# Patient Record
Sex: FEMALE
[De-identification: names, ages, dates, MRNs, and addresses within clinical notes are randomized per-mention and may not be internally consistent; named-entity substitution may affect disease eponyms.]

---

## 2023-05-18 ENCOUNTER — APPOINTMENT (OUTPATIENT)
Dept: INTERNAL MEDICINE | Facility: CLINIC | Age: 68
End: 2023-05-18
Payer: MEDICARE

## 2023-05-18 ENCOUNTER — INPATIENT (INPATIENT)
Facility: HOSPITAL | Age: 68
LOS: 6 days | Discharge: HOME | DRG: 304 | End: 2023-05-25
Attending: HOSPITALIST | Admitting: EMERGENCY MEDICINE
Payer: MEDICARE

## 2023-05-18 VITALS
TEMPERATURE: 98 F | OXYGEN SATURATION: 99 % | RESPIRATION RATE: 20 BRPM | DIASTOLIC BLOOD PRESSURE: 113 MMHG | WEIGHT: 111.99 LBS | HEART RATE: 93 BPM | SYSTOLIC BLOOD PRESSURE: 220 MMHG

## 2023-05-18 VITALS
WEIGHT: 108 LBS | OXYGEN SATURATION: 100 % | RESPIRATION RATE: 16 BRPM | TEMPERATURE: 97.8 F | SYSTOLIC BLOOD PRESSURE: 198 MMHG | HEIGHT: 63 IN | BODY MASS INDEX: 19.14 KG/M2 | HEART RATE: 95 BPM | DIASTOLIC BLOOD PRESSURE: 115 MMHG

## 2023-05-18 DIAGNOSIS — G47.00 INSOMNIA, UNSPECIFIED: ICD-10-CM

## 2023-05-18 DIAGNOSIS — Z83.3 FAMILY HISTORY OF DIABETES MELLITUS: ICD-10-CM

## 2023-05-18 DIAGNOSIS — Z00.00 ENCOUNTER FOR GENERAL ADULT MEDICAL EXAMINATION W/OUT ABNORMAL FINDINGS: ICD-10-CM

## 2023-05-18 DIAGNOSIS — I63.9 CEREBRAL INFARCTION, UNSPECIFIED: ICD-10-CM

## 2023-05-18 DIAGNOSIS — I67.1 CEREBRAL ANEURYSM, NONRUPTURED: ICD-10-CM

## 2023-05-18 DIAGNOSIS — I10 ESSENTIAL (PRIMARY) HYPERTENSION: ICD-10-CM

## 2023-05-18 DIAGNOSIS — Z83.438 FAMILY HISTORY OF OTHER DISORDER OF LIPOPROTEIN METABOLISM AND OTHER LIPIDEMIA: ICD-10-CM

## 2023-05-18 DIAGNOSIS — I71.40 ABDOMINAL AORTIC ANEURYSM, WITHOUT RUPTURE, UNSPECIFIED: ICD-10-CM

## 2023-05-18 DIAGNOSIS — E78.5 HYPERLIPIDEMIA, UNSPECIFIED: ICD-10-CM

## 2023-05-18 DIAGNOSIS — I21.9 ACUTE MYOCARDIAL INFARCTION, UNSPECIFIED: ICD-10-CM

## 2023-05-18 DIAGNOSIS — I16.1 HYPERTENSIVE EMERGENCY: ICD-10-CM

## 2023-05-18 DIAGNOSIS — Z95.5 PRESENCE OF CORONARY ANGIOPLASTY IMPLANT AND GRAFT: ICD-10-CM

## 2023-05-18 DIAGNOSIS — Z82.49 FAMILY HISTORY OF ISCHEMIC HEART DISEASE AND OTHER DISEASES OF THE CIRCULATORY SYSTEM: ICD-10-CM

## 2023-05-18 LAB
ALBUMIN SERPL ELPH-MCNC: 2.9 G/DL — LOW (ref 3.3–5)
ALP SERPL-CCNC: 111 U/L — SIGNIFICANT CHANGE UP (ref 40–120)
ALT FLD-CCNC: 25 U/L — SIGNIFICANT CHANGE UP (ref 12–78)
ANION GAP SERPL CALC-SCNC: 8 MMOL/L — SIGNIFICANT CHANGE UP (ref 5–17)
APPEARANCE UR: CLEAR — SIGNIFICANT CHANGE UP
AST SERPL-CCNC: 19 U/L — SIGNIFICANT CHANGE UP (ref 15–37)
BASOPHILS # BLD AUTO: 0.03 K/UL — SIGNIFICANT CHANGE UP (ref 0–0.2)
BASOPHILS NFR BLD AUTO: 0.4 % — SIGNIFICANT CHANGE UP (ref 0–2)
BILIRUB SERPL-MCNC: 0.3 MG/DL — SIGNIFICANT CHANGE UP (ref 0.2–1.2)
BILIRUB UR-MCNC: NEGATIVE — SIGNIFICANT CHANGE UP
BUN SERPL-MCNC: 27 MG/DL — HIGH (ref 7–23)
CALCIUM SERPL-MCNC: 8.3 MG/DL — LOW (ref 8.5–10.1)
CHLORIDE SERPL-SCNC: 111 MMOL/L — HIGH (ref 96–108)
CO2 SERPL-SCNC: 24 MMOL/L — SIGNIFICANT CHANGE UP (ref 22–31)
COLOR SPEC: YELLOW — SIGNIFICANT CHANGE UP
CREAT SERPL-MCNC: 1.3 MG/DL — SIGNIFICANT CHANGE UP (ref 0.5–1.3)
DIFF PNL FLD: NEGATIVE — SIGNIFICANT CHANGE UP
EGFR: 45 ML/MIN/1.73M2 — LOW
EOSINOPHIL # BLD AUTO: 0.24 K/UL — SIGNIFICANT CHANGE UP (ref 0–0.5)
EOSINOPHIL NFR BLD AUTO: 3 % — SIGNIFICANT CHANGE UP (ref 0–6)
GLUCOSE SERPL-MCNC: 83 MG/DL — SIGNIFICANT CHANGE UP (ref 70–99)
GLUCOSE UR QL: NEGATIVE — SIGNIFICANT CHANGE UP
HCT VFR BLD CALC: 31.5 % — LOW (ref 34.5–45)
HGB BLD-MCNC: 9.7 G/DL — LOW (ref 11.5–15.5)
IMM GRANULOCYTES NFR BLD AUTO: 0.5 % — SIGNIFICANT CHANGE UP (ref 0–0.9)
KETONES UR-MCNC: NEGATIVE — SIGNIFICANT CHANGE UP
LEUKOCYTE ESTERASE UR-ACNC: NEGATIVE — SIGNIFICANT CHANGE UP
LYMPHOCYTES # BLD AUTO: 2.14 K/UL — SIGNIFICANT CHANGE UP (ref 1–3.3)
LYMPHOCYTES # BLD AUTO: 27 % — SIGNIFICANT CHANGE UP (ref 13–44)
MAGNESIUM SERPL-MCNC: 1.8 MG/DL — SIGNIFICANT CHANGE UP (ref 1.6–2.6)
MCHC RBC-ENTMCNC: 28.5 PG — SIGNIFICANT CHANGE UP (ref 27–34)
MCHC RBC-ENTMCNC: 30.8 GM/DL — LOW (ref 32–36)
MCV RBC AUTO: 92.6 FL — SIGNIFICANT CHANGE UP (ref 80–100)
MONOCYTES # BLD AUTO: 0.57 K/UL — SIGNIFICANT CHANGE UP (ref 0–0.9)
MONOCYTES NFR BLD AUTO: 7.2 % — SIGNIFICANT CHANGE UP (ref 2–14)
NEUTROPHILS # BLD AUTO: 4.9 K/UL — SIGNIFICANT CHANGE UP (ref 1.8–7.4)
NEUTROPHILS NFR BLD AUTO: 61.9 % — SIGNIFICANT CHANGE UP (ref 43–77)
NITRITE UR-MCNC: NEGATIVE — SIGNIFICANT CHANGE UP
NT-PROBNP SERPL-SCNC: HIGH PG/ML (ref 0–125)
PH UR: 6 — SIGNIFICANT CHANGE UP (ref 5–8)
PLATELET # BLD AUTO: 539 K/UL — HIGH (ref 150–400)
POTASSIUM SERPL-MCNC: 5.6 MMOL/L — HIGH (ref 3.5–5.3)
POTASSIUM SERPL-SCNC: 5.6 MMOL/L — HIGH (ref 3.5–5.3)
PROT SERPL-MCNC: 8.2 GM/DL — SIGNIFICANT CHANGE UP (ref 6–8.3)
PROT UR-MCNC: 30 MG/DL
RBC # BLD: 3.4 M/UL — LOW (ref 3.8–5.2)
RBC # FLD: 16.8 % — HIGH (ref 10.3–14.5)
SODIUM SERPL-SCNC: 143 MMOL/L — SIGNIFICANT CHANGE UP (ref 135–145)
SP GR SPEC: 1.01 — SIGNIFICANT CHANGE UP (ref 1.01–1.02)
TROPONIN I, HIGH SENSITIVITY RESULT: 61.16 NG/L — HIGH
TROPONIN I, HIGH SENSITIVITY RESULT: 61.25 NG/L — HIGH
UROBILINOGEN FLD QL: NEGATIVE — SIGNIFICANT CHANGE UP
WBC # BLD: 7.92 K/UL — SIGNIFICANT CHANGE UP (ref 3.8–10.5)
WBC # FLD AUTO: 7.92 K/UL — SIGNIFICANT CHANGE UP (ref 3.8–10.5)

## 2023-05-18 PROCEDURE — 94760 N-INVAS EAR/PLS OXIMETRY 1: CPT

## 2023-05-18 PROCEDURE — 81001 URINALYSIS AUTO W/SCOPE: CPT

## 2023-05-18 PROCEDURE — 97530 THERAPEUTIC ACTIVITIES: CPT | Mod: GP

## 2023-05-18 PROCEDURE — 84484 ASSAY OF TROPONIN QUANT: CPT

## 2023-05-18 PROCEDURE — 83880 ASSAY OF NATRIURETIC PEPTIDE: CPT

## 2023-05-18 PROCEDURE — 83605 ASSAY OF LACTIC ACID: CPT

## 2023-05-18 PROCEDURE — 82570 ASSAY OF URINE CREATININE: CPT

## 2023-05-18 PROCEDURE — 80053 COMPREHEN METABOLIC PANEL: CPT

## 2023-05-18 PROCEDURE — 84145 PROCALCITONIN (PCT): CPT

## 2023-05-18 PROCEDURE — 97162 PT EVAL MOD COMPLEX 30 MIN: CPT | Mod: GP

## 2023-05-18 PROCEDURE — 85025 COMPLETE CBC W/AUTO DIFF WBC: CPT

## 2023-05-18 PROCEDURE — 86803 HEPATITIS C AB TEST: CPT

## 2023-05-18 PROCEDURE — 97116 GAIT TRAINING THERAPY: CPT | Mod: GP

## 2023-05-18 PROCEDURE — 80048 BASIC METABOLIC PNL TOTAL CA: CPT

## 2023-05-18 PROCEDURE — 71045 X-RAY EXAM CHEST 1 VIEW: CPT | Mod: 26

## 2023-05-18 PROCEDURE — 85027 COMPLETE CBC AUTOMATED: CPT

## 2023-05-18 PROCEDURE — 84156 ASSAY OF PROTEIN URINE: CPT

## 2023-05-18 PROCEDURE — 99285 EMERGENCY DEPT VISIT HI MDM: CPT

## 2023-05-18 PROCEDURE — 82306 VITAMIN D 25 HYDROXY: CPT

## 2023-05-18 PROCEDURE — 82607 VITAMIN B-12: CPT

## 2023-05-18 PROCEDURE — 83540 ASSAY OF IRON: CPT

## 2023-05-18 PROCEDURE — 93010 ELECTROCARDIOGRAM REPORT: CPT

## 2023-05-18 PROCEDURE — 71046 X-RAY EXAM CHEST 2 VIEWS: CPT

## 2023-05-18 PROCEDURE — 93005 ELECTROCARDIOGRAM TRACING: CPT

## 2023-05-18 PROCEDURE — 84300 ASSAY OF URINE SODIUM: CPT

## 2023-05-18 PROCEDURE — 83550 IRON BINDING TEST: CPT

## 2023-05-18 PROCEDURE — 84466 ASSAY OF TRANSFERRIN: CPT

## 2023-05-18 PROCEDURE — 82728 ASSAY OF FERRITIN: CPT

## 2023-05-18 PROCEDURE — 84100 ASSAY OF PHOSPHORUS: CPT

## 2023-05-18 PROCEDURE — 93306 TTE W/DOPPLER COMPLETE: CPT

## 2023-05-18 PROCEDURE — 99387 INIT PM E/M NEW PAT 65+ YRS: CPT

## 2023-05-18 PROCEDURE — 36415 COLL VENOUS BLD VENIPUNCTURE: CPT

## 2023-05-18 PROCEDURE — 83735 ASSAY OF MAGNESIUM: CPT

## 2023-05-18 PROCEDURE — 70450 CT HEAD/BRAIN W/O DYE: CPT | Mod: 26,MA

## 2023-05-18 RX ORDER — LABETALOL HCL 100 MG
10 TABLET ORAL ONCE
Refills: 0 | Status: COMPLETED | OUTPATIENT
Start: 2023-05-18 | End: 2023-05-18

## 2023-05-18 RX ORDER — CARVEDILOL PHOSPHATE 80 MG/1
25 CAPSULE, EXTENDED RELEASE ORAL ONCE
Refills: 0 | Status: COMPLETED | OUTPATIENT
Start: 2023-05-18 | End: 2023-05-18

## 2023-05-18 RX ORDER — ASPIRIN/CALCIUM CARB/MAGNESIUM 324 MG
324 TABLET ORAL ONCE
Refills: 0 | Status: COMPLETED | OUTPATIENT
Start: 2023-05-18 | End: 2023-05-18

## 2023-05-18 RX ORDER — HYDRALAZINE HCL 50 MG
25 TABLET ORAL ONCE
Refills: 0 | Status: COMPLETED | OUTPATIENT
Start: 2023-05-18 | End: 2023-05-18

## 2023-05-18 RX ORDER — OXYCODONE HYDROCHLORIDE 5 MG/1
5 TABLET ORAL ONCE
Refills: 0 | Status: DISCONTINUED | OUTPATIENT
Start: 2023-05-18 | End: 2023-05-18

## 2023-05-18 RX ORDER — ALPRAZOLAM 0.25 MG
0.5 TABLET ORAL ONCE
Refills: 0 | Status: DISCONTINUED | OUTPATIENT
Start: 2023-05-18 | End: 2023-05-18

## 2023-05-18 RX ADMIN — Medication 324 MILLIGRAM(S): at 17:41

## 2023-05-18 RX ADMIN — Medication 0.5 MILLIGRAM(S): at 21:15

## 2023-05-18 RX ADMIN — OXYCODONE HYDROCHLORIDE 5 MILLIGRAM(S): 5 TABLET ORAL at 15:19

## 2023-05-18 RX ADMIN — Medication 5 MILLIGRAM(S): at 18:22

## 2023-05-18 RX ADMIN — Medication 25 MILLIGRAM(S): at 20:31

## 2023-05-18 RX ADMIN — CARVEDILOL PHOSPHATE 25 MILLIGRAM(S): 80 CAPSULE, EXTENDED RELEASE ORAL at 20:31

## 2023-05-18 RX ADMIN — Medication 10 MILLIGRAM(S): at 15:19

## 2023-05-18 NOTE — ED PROVIDER NOTE - ATTENDING CONTRIBUTION TO CARE
Dr. Ghosh: I performed a face to face bedside interview with patient regarding history of present illness, review of symptoms and past medical history. I completed an independent physical exam.  I have discussed patient's plan of care with resident.   I agree with note as stated above, having amended the EMR as needed to reflect my findings.   This includes HISTORY OF PRESENT ILLNESS, HIV, PAST MEDICAL/SURGICAL/FAMILY/SOCIAL HISTORY, ALLERGIES AND HOME MEDICATIONS, REVIEW OF SYSTEMS, PHYSICAL EXAM, and any PROGRESS NOTES during the time I functioned as the attending physician for this patient.  PIERO Ghosh DO

## 2023-05-18 NOTE — ED ADULT NURSE NOTE - NSFALLUNIVINTERV_ED_ALL_ED
Bed/Stretcher in lowest position, wheels locked, appropriate side rails in place/Call bell, personal items and telephone in reach/Instruct patient to call for assistance before getting out of bed/chair/stretcher/Non-slip footwear applied when patient is off stretcher/[place] to call system/Physically safe environment - no spills, clutter or unnecessary equipment/Purposeful proactive rounding/Room/bathroom lighting operational, light cord in reach

## 2023-05-18 NOTE — ED PROVIDER NOTE - CLINICAL SUMMARY MEDICAL DECISION MAKING FREE TEXT BOX
pt with HTN emergency will get CAT scan of head because of HA, labs troponin, social work consult and admit pt until her social, emotional problems can be worked out

## 2023-05-18 NOTE — ED ADULT NURSE NOTE - OBJECTIVE STATEMENT
presents to ed from pmd office for elevated blood pressure. patient is supposed to be on htn medications but has no been able to refill her medications due to the patients insurance changing and the dr no longer accepting her insurance. patient bp upon arrival elevated, patient given two doses of iv labetalol with positive relief

## 2023-05-18 NOTE — ED PROVIDER NOTE - PROGRESS NOTE DETAILS
Patient remains hypertensive.  Lab work reviewed and notable for troponin leak and an elevated BNP.  Concerns for possible hypertensive emergency at this time.  EKG reviewed and as noted below:    Heart rate 97 bpm's within normal limits,  ms within normal limits, QRS 66 ms within normal limits, QTc 454 ms within normal limits.  There are no acute ST segment elevations or STEMI's.  No ST segment depressions or pathologic T wave inversions.    Given patient's troponin leak aspirin was ordered, pending repeat troponin at this time.  Will control blood pressure in the setting of a troponin leak, likely type II MI secondary to hypertension.  We will control hypertension with labetalol.  Will admit patient for further evaluation and management.

## 2023-05-18 NOTE — ED PROVIDER NOTE - OBJECTIVE STATEMENT
67 yo female wPMHx of HTN, depression, chronic pain, CAD, stents, abd aneurysm with surgery presents to the ED from MD Dr. Aguayo as her new primary care doctor  c/o HTN, HA, depression. Pt has been living in a shelter for a month after coming back from [place]. Her insurance did not cover her medication and primary care doctor for a month. Denies chest pain, SI/HI

## 2023-05-18 NOTE — ED PROVIDER NOTE - CARE PLAN
1 Principal Discharge DX:	Hypertensive emergency  Secondary Diagnosis:	NSTEMI (non-ST elevation myocardial infarction)

## 2023-05-18 NOTE — ED PROVIDER NOTE - NS ED ATTENDING STATEMENT MOD
I have seen and examined this patient and fully participated in the care of this patient as the teaching attending.  The service was shared with the LEE.  I reviewed and verified the documentation and independently performed the documented:

## 2023-05-18 NOTE — ED ADULT TRIAGE NOTE - CHIEF COMPLAINT QUOTE
pt presents to ED from MD office. c/o hypertension. c/o HA. denies blurry vision, chest pain. pt was homeless and not able to get HTN medications. now living in shelter.

## 2023-05-19 VITALS — SYSTOLIC BLOOD PRESSURE: 195 MMHG | DIASTOLIC BLOOD PRESSURE: 110 MMHG

## 2023-05-19 DIAGNOSIS — I25.10 ATHEROSCLEROTIC HEART DISEASE OF NATIVE CORONARY ARTERY WITHOUT ANGINA PECTORIS: ICD-10-CM

## 2023-05-19 DIAGNOSIS — I10 ESSENTIAL (PRIMARY) HYPERTENSION: ICD-10-CM

## 2023-05-19 PROBLEM — I71.40 ABDOMINAL AORTIC ANEURYSM (AAA): Status: ACTIVE | Noted: 2023-05-19

## 2023-05-19 PROBLEM — E78.5 HLD (HYPERLIPIDEMIA): Status: ACTIVE | Noted: 2023-05-19

## 2023-05-19 PROBLEM — Z00.00 ENCOUNTER FOR PREVENTIVE HEALTH EXAMINATION: Status: ACTIVE | Noted: 2023-05-17

## 2023-05-19 PROBLEM — I63.9 STROKE: Status: ACTIVE | Noted: 2023-05-19

## 2023-05-19 PROBLEM — I67.1 BRAIN ANEURYSM: Status: ACTIVE | Noted: 2023-05-19

## 2023-05-19 PROBLEM — G47.00 INSOMNIA: Status: ACTIVE | Noted: 2023-05-19

## 2023-05-19 PROBLEM — I21.9 MYOCARDIAL INFARCTION: Status: ACTIVE | Noted: 2023-05-19

## 2023-05-19 PROBLEM — Z95.5 HISTORY OF HEART ARTERY STENT: Status: ACTIVE | Noted: 2023-05-19

## 2023-05-19 LAB
ANION GAP SERPL CALC-SCNC: 6 MMOL/L — SIGNIFICANT CHANGE UP (ref 5–17)
BACTERIA # UR AUTO: ABNORMAL
BUN SERPL-MCNC: 28 MG/DL — HIGH (ref 7–23)
CALCIUM SERPL-MCNC: 7.6 MG/DL — LOW (ref 8.5–10.1)
CHLORIDE SERPL-SCNC: 113 MMOL/L — HIGH (ref 96–108)
CO2 SERPL-SCNC: 24 MMOL/L — SIGNIFICANT CHANGE UP (ref 22–31)
CREAT SERPL-MCNC: 1.29 MG/DL — SIGNIFICANT CHANGE UP (ref 0.5–1.3)
EGFR: 45 ML/MIN/1.73M2 — LOW
EPI CELLS # UR: SIGNIFICANT CHANGE UP
GLUCOSE SERPL-MCNC: 93 MG/DL — SIGNIFICANT CHANGE UP (ref 70–99)
HCT VFR BLD CALC: 26.2 % — LOW (ref 34.5–45)
HCV AB S/CO SERPL IA: 0.16 S/CO — SIGNIFICANT CHANGE UP (ref 0–0.99)
HCV AB SERPL-IMP: SIGNIFICANT CHANGE UP
HGB BLD-MCNC: 8 G/DL — LOW (ref 11.5–15.5)
IRON SATN MFR SERPL: 25 UG/DL — LOW (ref 30–160)
IRON SATN MFR SERPL: 7 % — LOW (ref 14–50)
MCHC RBC-ENTMCNC: 28.1 PG — SIGNIFICANT CHANGE UP (ref 27–34)
MCHC RBC-ENTMCNC: 30.5 GM/DL — LOW (ref 32–36)
MCV RBC AUTO: 91.9 FL — SIGNIFICANT CHANGE UP (ref 80–100)
PLATELET # BLD AUTO: 446 K/UL — HIGH (ref 150–400)
POTASSIUM SERPL-MCNC: 5.5 MMOL/L — HIGH (ref 3.5–5.3)
POTASSIUM SERPL-SCNC: 5.5 MMOL/L — HIGH (ref 3.5–5.3)
RBC # BLD: 2.85 M/UL — LOW (ref 3.8–5.2)
RBC # FLD: 16.8 % — HIGH (ref 10.3–14.5)
RBC CASTS # UR COMP ASSIST: SIGNIFICANT CHANGE UP /HPF (ref 0–4)
SODIUM SERPL-SCNC: 143 MMOL/L — SIGNIFICANT CHANGE UP (ref 135–145)
TIBC SERPL-MCNC: 377 UG/DL — SIGNIFICANT CHANGE UP (ref 220–430)
TROPONIN I, HIGH SENSITIVITY RESULT: 44.35 NG/L — SIGNIFICANT CHANGE UP
UIBC SERPL-MCNC: 353 UG/DL — SIGNIFICANT CHANGE UP (ref 110–370)
WBC # BLD: 7.59 K/UL — SIGNIFICANT CHANGE UP (ref 3.8–10.5)
WBC # FLD AUTO: 7.59 K/UL — SIGNIFICANT CHANGE UP (ref 3.8–10.5)
WBC UR QL: SIGNIFICANT CHANGE UP /HPF (ref 0–5)

## 2023-05-19 PROCEDURE — 99222 1ST HOSP IP/OBS MODERATE 55: CPT

## 2023-05-19 PROCEDURE — 93306 TTE W/DOPPLER COMPLETE: CPT | Mod: 26

## 2023-05-19 PROCEDURE — 12345: CPT | Mod: NC

## 2023-05-19 RX ORDER — ACETAMINOPHEN 500 MG
650 TABLET ORAL EVERY 6 HOURS
Refills: 0 | Status: DISCONTINUED | OUTPATIENT
Start: 2023-05-19 | End: 2023-05-25

## 2023-05-19 RX ORDER — HYDRALAZINE HCL 50 MG
25 TABLET ORAL
Refills: 0 | Status: DISCONTINUED | OUTPATIENT
Start: 2023-05-19 | End: 2023-05-25

## 2023-05-19 RX ORDER — ALPRAZOLAM 0.25 MG
0.5 TABLET ORAL
Refills: 0 | Status: DISCONTINUED | OUTPATIENT
Start: 2023-05-19 | End: 2023-05-25

## 2023-05-19 RX ORDER — GABAPENTIN 400 MG/1
300 CAPSULE ORAL
Refills: 0 | Status: DISCONTINUED | OUTPATIENT
Start: 2023-05-19 | End: 2023-05-25

## 2023-05-19 RX ORDER — ONDANSETRON 8 MG/1
4 TABLET, FILM COATED ORAL EVERY 8 HOURS
Refills: 0 | Status: DISCONTINUED | OUTPATIENT
Start: 2023-05-19 | End: 2023-05-25

## 2023-05-19 RX ORDER — SODIUM ZIRCONIUM CYCLOSILICATE 10 G/10G
10 POWDER, FOR SUSPENSION ORAL ONCE
Refills: 0 | Status: COMPLETED | OUTPATIENT
Start: 2023-05-19 | End: 2023-05-19

## 2023-05-19 RX ORDER — ATORVASTATIN CALCIUM 80 MG/1
10 TABLET, FILM COATED ORAL AT BEDTIME
Refills: 0 | Status: DISCONTINUED | OUTPATIENT
Start: 2023-05-19 | End: 2023-05-25

## 2023-05-19 RX ORDER — LANOLIN ALCOHOL/MO/W.PET/CERES
3 CREAM (GRAM) TOPICAL AT BEDTIME
Refills: 0 | Status: DISCONTINUED | OUTPATIENT
Start: 2023-05-19 | End: 2023-05-25

## 2023-05-19 RX ORDER — HYDRALAZINE HCL 50 MG
25 TABLET ORAL ONCE
Refills: 0 | Status: COMPLETED | OUTPATIENT
Start: 2023-05-19 | End: 2023-05-19

## 2023-05-19 RX ORDER — CARVEDILOL PHOSPHATE 80 MG/1
25 CAPSULE, EXTENDED RELEASE ORAL EVERY 12 HOURS
Refills: 0 | Status: DISCONTINUED | OUTPATIENT
Start: 2023-05-19 | End: 2023-05-25

## 2023-05-19 RX ORDER — OXYCODONE HYDROCHLORIDE 5 MG/1
5 TABLET ORAL ONCE
Refills: 0 | Status: DISCONTINUED | OUTPATIENT
Start: 2023-05-19 | End: 2023-05-19

## 2023-05-19 RX ORDER — ALPRAZOLAM 0.25 MG
0.5 TABLET ORAL ONCE
Refills: 0 | Status: DISCONTINUED | OUTPATIENT
Start: 2023-05-19 | End: 2023-05-19

## 2023-05-19 RX ORDER — PANTOPRAZOLE SODIUM 20 MG/1
40 TABLET, DELAYED RELEASE ORAL
Refills: 0 | Status: DISCONTINUED | OUTPATIENT
Start: 2023-05-19 | End: 2023-05-25

## 2023-05-19 RX ORDER — ASPIRIN/CALCIUM CARB/MAGNESIUM 324 MG
81 TABLET ORAL DAILY
Refills: 0 | Status: DISCONTINUED | OUTPATIENT
Start: 2023-05-19 | End: 2023-05-25

## 2023-05-19 RX ADMIN — Medication 0.5 MILLIGRAM(S): at 09:09

## 2023-05-19 RX ADMIN — SODIUM ZIRCONIUM CYCLOSILICATE 10 GRAM(S): 10 POWDER, FOR SUSPENSION ORAL at 11:42

## 2023-05-19 RX ADMIN — Medication 25 MILLIGRAM(S): at 23:18

## 2023-05-19 RX ADMIN — ATORVASTATIN CALCIUM 10 MILLIGRAM(S): 80 TABLET, FILM COATED ORAL at 21:29

## 2023-05-19 RX ADMIN — CARVEDILOL PHOSPHATE 25 MILLIGRAM(S): 80 CAPSULE, EXTENDED RELEASE ORAL at 21:29

## 2023-05-19 RX ADMIN — Medication 25 MILLIGRAM(S): at 02:41

## 2023-05-19 RX ADMIN — ONDANSETRON 4 MILLIGRAM(S): 8 TABLET, FILM COATED ORAL at 05:39

## 2023-05-19 RX ADMIN — CARVEDILOL PHOSPHATE 25 MILLIGRAM(S): 80 CAPSULE, EXTENDED RELEASE ORAL at 10:16

## 2023-05-19 RX ADMIN — Medication 25 MILLIGRAM(S): at 13:13

## 2023-05-19 RX ADMIN — Medication 25 MILLIGRAM(S): at 05:39

## 2023-05-19 RX ADMIN — Medication 25 MILLIGRAM(S): at 17:37

## 2023-05-19 RX ADMIN — GABAPENTIN 300 MILLIGRAM(S): 400 CAPSULE ORAL at 08:07

## 2023-05-19 RX ADMIN — Medication 81 MILLIGRAM(S): at 10:16

## 2023-05-19 RX ADMIN — PANTOPRAZOLE SODIUM 40 MILLIGRAM(S): 20 TABLET, DELAYED RELEASE ORAL at 05:39

## 2023-05-19 RX ADMIN — SODIUM ZIRCONIUM CYCLOSILICATE 10 GRAM(S): 10 POWDER, FOR SUSPENSION ORAL at 05:39

## 2023-05-19 RX ADMIN — Medication 0.5 MILLIGRAM(S): at 15:28

## 2023-05-19 RX ADMIN — OXYCODONE HYDROCHLORIDE 5 MILLIGRAM(S): 5 TABLET ORAL at 01:01

## 2023-05-19 NOTE — HEALTH RISK ASSESSMENT
[No] : In the past 12 months have you used drugs other than those required for medical reasons? No [3] : 2) Feeling down, depressed, or hopeless for nearly every day (3) [PHQ-2 Positive] : PHQ-2 Positive [Current] : Current [5-9] : 5-9 [Patient reported mammogram was normal] : Patient reported mammogram was normal [Patient reported PAP Smear was normal] : Patient reported PAP Smear was normal [Patient reported bone density results were abnormal] : Patient reported bone density results were abnormal [Patient reported colonoscopy was normal] : Patient reported colonoscopy was normal [HIV Test offered] : HIV Test offered [Hepatitis C test offered] : Hepatitis C test offered [Homeless] : homeless [Retired] : retired [Single] : single [de-identified] : she used to drink alochol. quit when she was 39.  [MammogramDate] : 01/2021 [PapSmearDate] : 01/2018 [BoneDensityDate] : 01/2018 [BoneDensityComments] : osteopenia [ColonoscopyDate] : 01/2022 [ColonoscopyComments] : states she had EGD and colonoscopy last year in [place].  [FreeTextEntry2] : she used to waittress, .  [de-identified] : smokes 2 ciggs daily.  quti for 2 years. and restarted a month ago. used to smoke seldomly prior since she was 16yo.

## 2023-05-19 NOTE — H&P ADULT - NSHPPHYSICALEXAM_GEN_ALL_CORE
T(C): 36.3 (05-19-23 @ 02:39), Max: 37 (05-18-23 @ 15:17)  HR: 82 (05-19-23 @ 03:51) (74 - 93)  BP: 190/81 (05-19-23 @ 03:51) (150/79 - 220/113)  RR: 16 (05-19-23 @ 02:39) (15 - 22)  SpO2: 98% (05-19-23 @ 02:39) (88% - 100%)    CONSTITUTIONAL: Well groomed, no apparent distress  EYES: PERRLA and symmetric, EOMI, No conjunctival or scleral injection, non-icteric  ENMT: Oral mucosa with moist membranes. Normal dentition; no pharyngeal injection or exudates             NECK: Supple, symmetric and without tracheal deviation   RESP: No respiratory distress, no use of accessory muscles; CTA b/l, no WRR  CV: RRR, +S1S2, no MRG; no JVD; no peripheral edema  GI: Soft, NT, ND, no rebound, no guarding; no palpable masses; no hepatosplenomegaly; no hernia palpated  LYMPH: No cervical LAD or tenderness; no axillary LAD or tenderness; no inguinal LAD or tenderness  MSK: Normal ROM without pain, normal muscle strength/tone  SKIN: No rashes or ulcers noted; no subcutaneous nodules or induration palpable  NEURO: CN II-XII intact; normal reflexes in upper and lower extremities, sensation intact in upper and lower extremities b/l to light touch   PSYCH: Appropriate insight/judgment; A+O x 3, mood and affect appropriate, recent/remote memory intact

## 2023-05-19 NOTE — PROGRESS NOTE ADULT - ASSESSMENT
69 yo woman with CAD, hx of MI, chronic pain, osteoarthritis, not on medication for past several weeks as she was unable to be seen by her PCP, is now undomiciled, sent to ED from new PCP office when she was noted there to have markedly elevated blood pressures. In the ED here, BP elevated. Troponin minimally elevated. BNP 29,000. EKG without acute ischemic changes. CXR clear. CT head w/ old lacunar infarct. Admitted to Medicine.     HTN emergency  BP now improved. Rather asymptomatic.   - Continue coreg, hydralazine    CAD  No angina or CHF sx.   - Continue coreg, aspirin, statin      Dispo: Pending shelter placement, needs 3-night stay  69 yo woman with CAD, hx of MI, chronic pain, osteoarthritis, not on medication for past several weeks as she was unable to be seen by her PCP, is now undomiciled, sent to ED from new PCP office when she was noted there to have markedly elevated blood pressures. In the ED here, BP elevated. Troponin minimally elevated. BNP 29,000. EKG without acute ischemic changes. CXR clear. CT head w/ old lacunar infarct. Admitted to Medicine.     HTN emergency  BP now improved. Rather asymptomatic.   - Continue coreg, hydralazine    CAD  No angina or CHF sx.   - Continue coreg, aspirin, statin    HyperK  K 5.5. Lokelma ordered  - Trend K      Dispo: Pending shelter placement, needs 3-night stay

## 2023-05-19 NOTE — ED ADULT NURSE REASSESSMENT NOTE - NS ED NURSE REASSESS COMMENT FT1
Pt resting comfortably in bed, Hypertensive, MD made aware, no STAT orders at this time, Medicated for pain, pt reports no pain at this time.

## 2023-05-19 NOTE — HISTORY OF PRESENT ILLNESS
[FreeTextEntry1] : cpe [de-identified] : 68y f w/ pMHx HTN , sciatica,. brain aneurysm,  abdominal aneurysm, heart attack 2019 in [place] s/p stents, mini stroke 05/2022 and 6/2022, current smoker, presenting to establish care/ cpe. \par  \par she has been living in a shelter for the last month. has been out of medication for the last month as she was having issues with her insurance.  states she is severely depressed. \par \par states that Dr. Sherman in [place] is her pain management doctor. states she has received epidurals for her back pain. she hasn’t seen recently though bc of insurance issues which she states are now settled. she plans to go back to them now that insurance is settled but states they want her to estbalihs care with PCP first. she recently came from [place]. states she had her GB removed in march and developed scar tissue. she also has chronic pain of her shoulder since shoulder surgery a year ago. also has sciatica. states she receives oxycodone and would like a refill to hold her over until she can see pain management again. ISTOP checked. Reference #:[id number]\par \par HTN: not currently taking any meds other than baby aspirin OTC for the laste month but prior she was on carvedilol 25mg BID, hydralazine and 25mg 4x daily.\par \par HLD: states she used to take a statin but she is not sure which. \par \par insomnia: melatonin 10mg. \par \par gabapentin states she is on 800mg 4x daily. \par \par states her old pcp used to give her xanax. she used to take it everyday BID since last year. she last took a dose a month ago. she would like refill. \par                  \par she has not met with vascular surgeon for her AA. does not have a cardiologist in [place]. \par \par she takes a MVT.

## 2023-05-19 NOTE — PATIENT PROFILE ADULT - SAFE PLACE TO LIVE - DETAILS
patient lives in a homeless shelter and will lose her bed if she is ever in the hospital for longer than 3 days.

## 2023-05-19 NOTE — ASSESSMENT
[FreeTextEntry1] : Physical Exam:\par Constitutional: distressed, tearful, anxious\par HEENT: Normocephalic, atraumatic\par Neck: supple\par Cardiac:  Regular rate and rhythm, No murmurs\par Pulmonary: No respiratory distress, Lungs clear to auscultation bilaterally, no wheezing, rales, or rhonchi\par Abdomen: Soft, non-tender, non-distended, no guarding, normal bowel sounds\par Vascular: No peripheral edema\par Neurology: Coordination grossly intact, no focal deficits\par Psychiatric: anxious, irritable\par \par \par \par A/P:\par HCM:\par - flu- UTD\par - TDAP- UTD\par - pneumonia vaccine- UTD\par - shingles vaccine - UTD\par - Colon CA screening- states she had done in [place] last year\par - Breast CA screening- due\par - cervical CA screening- due\par - DEXA screening- due\par \par HTN/AAA/ CVD/ CAD/ cardiac stents\par BP very elevated\par pt very high risk for MI, stroke, aneurysmal rupture\par - pt was urgently sent to the ER for complete evaluation and tx. will need urgent labs, ekg, cardiology evaluation\par - pt will need close monitoring while BP is slowly dropped to normal range \par - pt was rec to f/u upon hospital discharge\par - pt will need to continue to see cardiology as outpatient once medically stable\par \par brain aneurysm\par  - pt will need to establish care with neurosurgery once medicall stable\par \par depression\par pt denies SI or intent to hurt herself or others\par but she is severely depressed, very tearful on exam\par - pt will need psychiatry consulted in the hospital as well \par - would benefit from psychiatry and therapy continued as outpatient as well\par - will defer xanax dosing to them, pt aware that long term benzo will need to come from them\par \par chronic pain/ sciatica/ shoulder pain\par  ISTOP checked. Reference #:[id number]\par pt was given 6 days worth of oxycodone in april \par prior she was on tramadol from a diff PCP?\par in [place] she was on norco and tramadol\par - she must obtain all pain medications from the same provider. will need to get from pain management, as indicated by them, when she is discharged.\par \par HLD: \par - will need to get fasting lipids once she is stable from hospital\par \par insomnia: \par - can c/w melatonin \par

## 2023-05-19 NOTE — CONSULT NOTE ADULT - PROBLEM SELECTOR RECOMMENDATION 9
Uncontrolled due to lapse in Rx availability.  Coreg and hydralazine have been resumed -- will titrate regimen based on BP response.

## 2023-05-19 NOTE — CONSULT NOTE ADULT - SUBJECTIVE AND OBJECTIVE BOX
CHIEF COMPLAINT: "I haven't had any medications."    HPI:  68 year old undomiciled woman with a history of CAD, MI (~ 5 years ago), coronary stent (details not known; placed while living in [place]), chronic pain, osteoarthritis, and recent cholecystectomy (March/April 2023) who was referred to the ED after establishing care with Dr Aguayo yesterday and found to have a severely elevated blood pressure.  She is living in a shelter and has not had any medications in several weeks.  However she has been feeling well; no angina, dyspnea palpitations, headache.  She offers no complaints at time of encounter.  She was previously prescribed Coreg and hydralazine for the management of her BP.      PAST MEDICAL & SURGICAL HISTORY:  CAD  MI  HTN  OA  Spine disease  s/p cholecystectomy  s/p back surgery  s/p shoulder surgery    SOCIAL HISTORY:   Alcohol: Quit in remote past  Smoking: Current smoker  Homeless and living in a shelter    FAMILY HISTORY: Details not known    MEDICATIONS  (STANDING):  aspirin enteric coated 81 milliGRAM(s) Oral daily  atorvastatin 10 milliGRAM(s) Oral at bedtime  carvedilol 25 milliGRAM(s) Oral every 12 hours  hydrALAZINE 25 milliGRAM(s) Oral four times a day  pantoprazole    Tablet 40 milliGRAM(s) Oral before breakfast    MEDICATIONS  (PRN):  acetaminophen     Tablet .. 650 milliGRAM(s) Oral every 6 hours PRN Temp greater or equal to 38C (100.4F), Mild Pain (1 - 3)  ALPRAZolam 0.5 milliGRAM(s) Oral two times a day PRN Anxiety  aluminum hydroxide/magnesium hydroxide/simethicone Suspension 30 milliLiter(s) Oral every 4 hours PRN Dyspepsia  gabapentin 300 milliGRAM(s) Oral four times a day PRN pain  melatonin 3 milliGRAM(s) Oral at bedtime PRN Insomnia  ondansetron Injectable 4 milliGRAM(s) IV Push every 8 hours PRN Nausea and/or Vomiting    Allergies:  No Known Allergies    REVIEW OF SYSTEMS:  CONSTITUTIONAL: No weakness,  Eyes: No visual changes  NECK: No pain or stiffness  RESPIRATORY:  No shortness of breath  CARDIOVASCULAR: No chest pain or palpitations  GASTROINTESTINAL: No abdominal pain.  GENITOURINARY: No dysuria.  NEUROLOGICAL: No numbness.  SKIN: No itching or rash  All other review of systems is negative unless indicated above    VITAL SIGNS:   Vital Signs Last 24 Hrs  T(C): 36.3 (19 May 2023 02:39), Max: 37 (18 May 2023 15:17)  T(F): 97.4 (19 May 2023 02:39), Max: 98.6 (18 May 2023 15:17)  HR: 85 (19 May 2023 05:39) (74 - 93)  BP: 168/80 (19 May 2023 05:39) (150/79 - 220/113)  BP(mean): 104 (18 May 2023 23:01) (101 - 144)  RR: 16 (19 May 2023 02:39) (15 - 22)  SpO2: 98% (19 May 2023 02:39) (88% - 100%)    PHYSICAL EXAM:  Constitutional: NAD, awake and alert, appears older than stated age  HEENT:  No oral cyanosis.  Pulmonary: Non-labored, breath sounds are clear bilaterally  Cardiovascular: S1 and S2, regular  Gastrointestinal: Bowel Sounds present, soft, nontender.   Lymph: No peripheral edema.   Neurological: Alert, no focal deficits  Skin: Warm, dry  Psych:  Mood & affect appropriate    LABS:               8.0    7.59  )-----------( 446      ( 19 May 2023 06:30 )             26.2              143    |  113    |  28     ----------------------------<  93     5.5     |  24     |  1.29     Ca    7.6        19 May 2023 06:30  Mg     1.8       18 May 2023 14:08    TPro  8.2    /  Alb  2.9    /  TBili  0.3    /  DBili  x      /  AST  19     /  ALT  25     /  AlkPhos  111    18 May 2023 14:08    TroponinI hsT: 44.35, 61.25, 61.16    ECG: NSR    CT Head No Cont (05.18.23 @ 15:09):  Old lacunar infarct involving the right basal ganglia region is identified.  Evaluation of the osseous appropriate window appears unremarkable  The visualized paranasal sinuses appear clear  Opacification of the right mastoid region is seen which compatible with underlying inflammatory change.    Tele: SR

## 2023-05-19 NOTE — H&P ADULT - ASSESSMENT
A/P:    1.  Uncontrolled BP  -will start medication : Hydralazine, Coreg,   -follow BP and adjust medication as needed     2.  Elevated Troponin  -no acute EKG change  -no chest pain  -troponin level mildly elevated and stayed flat  -most likely due to demand ischemia  -follow troponin  -follow Echo  -follow cardiology consult    3.  Hyperkalemia  -gave Lokelma  -follow K level    4.  Anemia  -no acute bleeding  -follow Hb level    5.  SCD for DVT ppx    6.  Code status: Full code.

## 2023-05-19 NOTE — PROGRESS NOTE ADULT - SUBJECTIVE AND OBJECTIVE BOX
Chief Complaint: Sent to ED for elevated blood pressure    Interval Hx: Patient reports feeling okay, eager to be discharged as BP control has improved. However, patient is planned for discharge to shelter and anticipate that discharge might not occur until .     ROS: Multi system review is comprehensively negative x 10 systems    Vitals:   T(F): 98.3 (19 May 2023 10:08), Max: 98.3 (19 May 2023 10:08)  HR: 81 (19 May 2023 10:08) (74 - 89)  BP: 144/68 (19 May 2023 10:08) (144/68 - 218/107)  RR: 18 (19 May 2023 10:08) (15 - 22)  SpO2: 93% (19 May 2023 10:08) (88% - 100%) on 2L/min    Exam:  Gen: Comfortable  HEENT: NCAT PERRL EOMI MMM clear oropharynx  Neck: Supple, no JVD, no LAD, no thyromegaly  CVS: s1 s2 normal, RRR  Chest: Normal resp effort, lungs CTA B/L  Abd: +BS, soft NT ND   Ext: No edema or calf tenderness  Skin: Warm, dry  Mood: Calm, pleasant  Neuro: Awake and alert, answers questions appropriately, follows commands, no gross deficits    Labs:                        8.0    7.59  )--------( 446             26.2       Iron 25  TIBC 377  Iron Sat 7%    143  |  113  |  28  -----------------------<  93  5.5   |   24   |  1.29    Ca 7.6  Mg 1.8    TPro  8.2  /  Alb  2.9  /  TBili  0.3  /  DBili  x   /  AST  19  /  ALT  25  /  AlkPhos  111      Troponin 61, 44   proBNP 29,521    Urinalysis Basic - ( 18 May 2023 23:12 )  Color: Yellow / Appearance: Clear / S.010 / pH: x  Gluc: x / Ketone: Negative  / Bili: Negative / Urobili: Negative   Blood: x / Protein: 30 mg/dL / Nitrite: Negative   Leuk Esterase: Negative / RBC: 0-2 /HPF / WBC 0-2 /HPF   Sq Epi: x / Non Sq Epi: x / Bacteria: Few      Imaging:  CT head : Parenchymal volume loss and chronic microvascular ischemic changes are identified Old lacunar infarct involving the right basal ganglia region is identified. Evaluation of the osseous appropriate window appears unremarkable The visualized paranasal sinuses appear clear Opacification of the right mastoid region is seen which compatible with underlying inflammatory change.    CXR : The visualized lungs are clear of airspace consolidations or pleural effusions. No pneumothorax. The heart and mediastinum size and configuration are within normal limits. Visualized osseous thorax intact.    Cardiac Testing:  EKG : Rate 97. SR. LVH.     Meds:  MEDICATIONS  (STANDING):  aspirin enteric coated 81 milliGRAM(s) Oral daily  atorvastatin 10 milliGRAM(s) Oral at bedtime  carvedilol 25 milliGRAM(s) Oral every 12 hours  hydrALAZINE 25 milliGRAM(s) Oral four times a day  pantoprazole    Tablet 40 milliGRAM(s) Oral before breakfast    MEDICATIONS  (PRN):  acetaminophen     Tablet .. 650 milliGRAM(s) Oral every 6 hours PRN Temp greater or equal to 38C (100.4F), Mild Pain (1 - 3)  ALPRAZolam 0.5 milliGRAM(s) Oral two times a day PRN Anxiety  aluminum hydroxide/magnesium hydroxide/simethicone Suspension 30 milliLiter(s) Oral every 4 hours PRN Dyspepsia  gabapentin 300 milliGRAM(s) Oral four times a day PRN pain  melatonin 3 milliGRAM(s) Oral at bedtime PRN Insomnia  ondansetron Injectable 4 milliGRAM(s) IV Push every 8 hours PRN Nausea and/or Vomiting  oxycodone    5 mG/acetaminophen 325 mG 1 Tablet(s) Oral every 4 hours PRN Moderate Pain (4 - 6)

## 2023-05-19 NOTE — H&P ADULT - HISTORY OF PRESENT ILLNESS
69 yo Female wPMHx of HTN, presented to the ED with elevated BP. Patient run out of medication in recent months as she has no insurance. Patient has been living in a shelter for a month after coming back from [place]. Her insurance did not cover her medication and primary care doctor. Patient Denies chest pain and SI/HI. No other complain, No chest pain, No Dizziness. No headache. No weakness.

## 2023-05-19 NOTE — CONSULT NOTE ADULT - PROBLEM SELECTOR RECOMMENDATION 2
CAD s/p remote MI treated with coronary stent; minimal elevation of troponin (improved on subsequent assays) likely related to severe HTN rather than ACS.  No angina. Continue atorvastatin and aspirin.

## 2023-05-20 LAB
ANION GAP SERPL CALC-SCNC: 6 MMOL/L — SIGNIFICANT CHANGE UP (ref 5–17)
BUN SERPL-MCNC: 36 MG/DL — HIGH (ref 7–23)
CALCIUM SERPL-MCNC: 7.3 MG/DL — LOW (ref 8.5–10.1)
CHLORIDE SERPL-SCNC: 112 MMOL/L — HIGH (ref 96–108)
CO2 SERPL-SCNC: 24 MMOL/L — SIGNIFICANT CHANGE UP (ref 22–31)
CREAT SERPL-MCNC: 1.83 MG/DL — HIGH (ref 0.5–1.3)
EGFR: 30 ML/MIN/1.73M2 — LOW
GLUCOSE SERPL-MCNC: 91 MG/DL — SIGNIFICANT CHANGE UP (ref 70–99)
POTASSIUM SERPL-MCNC: 5.3 MMOL/L — SIGNIFICANT CHANGE UP (ref 3.5–5.3)
POTASSIUM SERPL-SCNC: 5.3 MMOL/L — SIGNIFICANT CHANGE UP (ref 3.5–5.3)
SODIUM SERPL-SCNC: 142 MMOL/L — SIGNIFICANT CHANGE UP (ref 135–145)

## 2023-05-20 PROCEDURE — 93010 ELECTROCARDIOGRAM REPORT: CPT

## 2023-05-20 PROCEDURE — 99233 SBSQ HOSP IP/OBS HIGH 50: CPT

## 2023-05-20 PROCEDURE — 99232 SBSQ HOSP IP/OBS MODERATE 35: CPT

## 2023-05-20 RX ORDER — SODIUM ZIRCONIUM CYCLOSILICATE 10 G/10G
10 POWDER, FOR SUSPENSION ORAL ONCE
Refills: 0 | Status: COMPLETED | OUTPATIENT
Start: 2023-05-20 | End: 2023-05-20

## 2023-05-20 RX ORDER — ONDANSETRON 8 MG/1
4 TABLET, FILM COATED ORAL EVERY 8 HOURS
Refills: 0 | Status: DISCONTINUED | OUTPATIENT
Start: 2023-05-20 | End: 2023-05-25

## 2023-05-20 RX ADMIN — Medication 81 MILLIGRAM(S): at 09:23

## 2023-05-20 RX ADMIN — ATORVASTATIN CALCIUM 10 MILLIGRAM(S): 80 TABLET, FILM COATED ORAL at 20:59

## 2023-05-20 RX ADMIN — CARVEDILOL PHOSPHATE 25 MILLIGRAM(S): 80 CAPSULE, EXTENDED RELEASE ORAL at 20:59

## 2023-05-20 RX ADMIN — Medication 25 MILLIGRAM(S): at 05:16

## 2023-05-20 RX ADMIN — CARVEDILOL PHOSPHATE 25 MILLIGRAM(S): 80 CAPSULE, EXTENDED RELEASE ORAL at 09:23

## 2023-05-20 RX ADMIN — ONDANSETRON 4 MILLIGRAM(S): 8 TABLET, FILM COATED ORAL at 09:23

## 2023-05-20 RX ADMIN — Medication 25 MILLIGRAM(S): at 23:50

## 2023-05-20 RX ADMIN — Medication 0.5 MILLIGRAM(S): at 20:59

## 2023-05-20 RX ADMIN — Medication 0.5 MILLIGRAM(S): at 09:23

## 2023-05-20 RX ADMIN — PANTOPRAZOLE SODIUM 40 MILLIGRAM(S): 20 TABLET, DELAYED RELEASE ORAL at 05:16

## 2023-05-20 RX ADMIN — SODIUM ZIRCONIUM CYCLOSILICATE 10 GRAM(S): 10 POWDER, FOR SUSPENSION ORAL at 12:14

## 2023-05-20 RX ADMIN — Medication 25 MILLIGRAM(S): at 12:14

## 2023-05-20 RX ADMIN — Medication 25 MILLIGRAM(S): at 18:39

## 2023-05-20 NOTE — PROGRESS NOTE ADULT - SUBJECTIVE AND OBJECTIVE BOX
CHIEF COMPLAINT: "I haven't had any medications."    HPI:  68 year old undomiciled woman with a history of CAD, MI (~ 5 years ago), coronary stent (details not known; placed while living in [place]), chronic pain, osteoarthritis, and recent cholecystectomy (March/April 2023) who was referred to the ED after establishing care with Dr Aguayo yesterday and found to have a severely elevated blood pressure.  She is living in a shelter and has not had any medications in several weeks.  However she has been feeling well; no angina, dyspnea palpitations, headache.  She offers no complaints at time of encounter.  She was previously prescribed Coreg and hydralazine for the management of her BP.    5/20/23 awake alert no complaints tele SR     PAST MEDICAL & SURGICAL HISTORY:  CAD  MI  HTN  OA  Spine disease  s/p cholecystectomy  s/p back surgery  s/p shoulder surgery    SOCIAL HISTORY:   Alcohol: Quit in remote past  Smoking: Current smoker  Homeless and living in a shelter    FAMILY HISTORY: Details not known    MEDICATIONS  (STANDING):  aspirin enteric coated 81 milliGRAM(s) Oral daily  atorvastatin 10 milliGRAM(s) Oral at bedtime  carvedilol 25 milliGRAM(s) Oral every 12 hours  hydrALAZINE 25 milliGRAM(s) Oral four times a day  pantoprazole    Tablet 40 milliGRAM(s) Oral before breakfast    MEDICATIONS  (PRN):  acetaminophen     Tablet .. 650 milliGRAM(s) Oral every 6 hours PRN Temp greater or equal to 38C (100.4F), Mild Pain (1 - 3)  ALPRAZolam 0.5 milliGRAM(s) Oral two times a day PRN Anxiety  aluminum hydroxide/magnesium hydroxide/simethicone Suspension 30 milliLiter(s) Oral every 4 hours PRN Dyspepsia  gabapentin 300 milliGRAM(s) Oral four times a day PRN pain  melatonin 3 milliGRAM(s) Oral at bedtime PRN Insomnia  ondansetron   Disintegrating Tablet 4 milliGRAM(s) Oral every 8 hours PRN Nausea and/or Vomiting  ondansetron Injectable 4 milliGRAM(s) IV Push every 8 hours PRN Nausea and/or Vomiting  oxycodone    5 mG/acetaminophen 325 mG 1 Tablet(s) Oral every 4 hours PRN Moderate Pain (4 - 6)      Allergies:  No Known Allergies      Vital Signs Last 24 Hrs  T(C): 36.2 (20 May 2023 08:17), Max: 36.3 (19 May 2023 20:35)  T(F): 97.2 (20 May 2023 08:17), Max: 97.3 (19 May 2023 20:35)  HR: 71 (20 May 2023 08:17) (71 - 75)  BP: 157/71 (20 May 2023 08:17) (141/58 - 167/82)  BP(mean): --  RR: 18 (20 May 2023 08:17) (16 - 18)  SpO2: 94% (20 May 2023 08:17) (94% - 98%)    Parameters below as of 20 May 2023 08:17  Patient On (Oxygen Delivery Method): room air      PHYSICAL EXAM:  Constitutional: NAD, awake and alert, appears older than stated age, frail   HEENT:  No oral cyanosis.  Pulmonary:  breath sounds are clear bilaterally  Cardiovascular: S1 and S2, regular + MADELYN  Gastrointestinal: Bowel Sounds present, soft, nontender.   Lymph: No peripheral edema.   Neurological: Alert, no focal deficits  Skin: Warm, dry  Psych:  Mood & affect appropriate    LABS:               8.0    7.59  )-----------( 446      ( 19 May 2023 06:30 )             26.2              143    |  113    |  28     ----------------------------<  93     5.5     |  24     |  1.29     Ca    7.6        19 May 2023 06:30  Mg     1.8       18 May 2023 14:08    TPro  8.2    /  Alb  2.9    /  TBili  0.3    /  DBili  x      /  AST  19     /  ALT  25     /  AlkPhos  111    18 May 2023 14:08    TroponinI hsT: 44.35, 61.25, 61.16    ECG: NSR    CT Head No Cont (05.18.23 @ 15:09):  Old lacunar infarct involving the right basal ganglia region is identified.  Evaluation of the osseous appropriate window appears unremarkable  The visualized paranasal sinuses appear clear  Opacification of the right mastoid region is seen which compatible with underlying inflammatory change.    Tele: SR    Echo   Mild concentric left ventricular hypertrophy is present.   Estimated left ventricular ejection fraction is 55-60 %.   The left atrium is mildly dilated.   Mild aortic sclerosis is present with normal valvular opening.   Moderate (2+) mitral regurgitation is present.   EA reversal of the mitral inflow consistent with reduced compliance of   the   left ventricle.     Signature     ----------------------------------------------------------------   Electronically signed by Vargas Augustine(Interpreting physician)   on 05/19/2023 09:48 PM                   CHIEF COMPLAINT: "I haven't had any medications."    HPI:  68 year old undomiciled woman with a history of CAD, MI (~ 5 years ago), coronary stent (details not known; placed while living in [place]), chronic pain, osteoarthritis, and recent cholecystectomy (March/April 2023) who was referred to the ED after establishing care with Dr Aguayo yesterday and found to have a severely elevated blood pressure.  She is living in a shelter and has not had any medications in several weeks.  However she has been feeling well; no angina, dyspnea palpitations, headache.  She offers no complaints at time of encounter.  She was previously prescribed Coreg and hydralazine for the management of her BP.    5/20/23 awake alert no complaints tele SR     MEDICATIONS  (STANDING):  aspirin enteric coated 81 milliGRAM(s) Oral daily  atorvastatin 10 milliGRAM(s) Oral at bedtime  carvedilol 25 milliGRAM(s) Oral every 12 hours  hydrALAZINE 25 milliGRAM(s) Oral four times a day  pantoprazole    Tablet 40 milliGRAM(s) Oral before breakfast    MEDICATIONS  (PRN):  acetaminophen     Tablet .. 650 milliGRAM(s) Oral every 6 hours PRN Temp greater or equal to 38C (100.4F), Mild Pain (1 - 3)  ALPRAZolam 0.5 milliGRAM(s) Oral two times a day PRN Anxiety  aluminum hydroxide/magnesium hydroxide/simethicone Suspension 30 milliLiter(s) Oral every 4 hours PRN Dyspepsia  gabapentin 300 milliGRAM(s) Oral four times a day PRN pain  melatonin 3 milliGRAM(s) Oral at bedtime PRN Insomnia  ondansetron   Disintegrating Tablet 4 milliGRAM(s) Oral every 8 hours PRN Nausea and/or Vomiting  ondansetron Injectable 4 milliGRAM(s) IV Push every 8 hours PRN Nausea and/or Vomiting  oxycodone    5 mG/acetaminophen 325 mG 1 Tablet(s) Oral every 4 hours PRN Moderate Pain (4 - 6)      Vital Signs Last 24 Hrs  T(C): 36.2 (20 May 2023 08:17), Max: 36.3 (19 May 2023 20:35)  T(F): 97.2 (20 May 2023 08:17), Max: 97.3 (19 May 2023 20:35)  HR: 71 (20 May 2023 08:17) (71 - 75)  BP: 157/71 (20 May 2023 08:17) (141/58 - 167/82)  RR: 18 (20 May 2023 08:17) (16 - 18)  SpO2: 94% (20 May 2023 08:17) (94% - 98%)      PHYSICAL EXAM:  Constitutional: NAD, awake and alert, appears older than stated age, frail   HEENT:  No oral cyanosis.  Pulmonary:  breath sounds are clear bilaterally  Cardiovascular: S1 and S2, regular + MADELYN  Gastrointestinal: Bowel Sounds present, soft, nontender.   Lymph: No peripheral edema.   Neurological: Alert, no focal deficits  Skin: Warm, dry  Psych:  Mood & affect appropriate    LABS:               8.0    7.59  )-----------( 446      ( 19 May 2023 06:30 )             26.2              143    |  113    |  28     ----------------------------<  93     5.5     |  24     |  1.29     Ca    7.6        19 May 2023 06:30  Mg     1.8       18 May 2023 14:08    TPro  8.2    /  Alb  2.9    /  TBili  0.3    /  DBili  x      /  AST  19     /  ALT  25     /  AlkPhos  111    18 May 2023 14:08    TroponinI hsT: 44.35, 61.25, 61.16    ECG: NSR    CT Head No Cont (05.18.23 @ 15:09):  Old lacunar infarct involving the right basal ganglia region is identified.  Evaluation of the osseous appropriate window appears unremarkable  The visualized paranasal sinuses appear clear  Opacification of the right mastoid region is seen which compatible with underlying inflammatory change.    Tele: SR    Echo   Mild concentric left ventricular hypertrophy is present.   Estimated left ventricular ejection fraction is 55-60 %.   The left atrium is mildly dilated.   Mild aortic sclerosis is present with normal valvular opening.   Moderate (2+) mitral regurgitation is present.   EA reversal of the mitral inflow consistent with reduced compliance of the left ventricle.

## 2023-05-20 NOTE — PROGRESS NOTE ADULT - ASSESSMENT
67 yo woman with CAD, hx of MI, chronic pain, osteoarthritis, not on medication for past several weeks as she was unable to be seen by her PCP, is now undomiciled, sent to ED from new PCP office when she was noted there to have markedly elevated blood pressures. In the ED here, BP elevated. Troponin minimally elevated. BNP 29,000. EKG without acute ischemic changes. CXR clear. CT head w/ old lacunar infarct. Admitted to Medicine.     HTN emergency  Uncontrolled HTN upon presentation due to lapse in Rx availability. Coreg and hydralazine have been resumed and blood pressure is improving. Rather asymptomatic at this point.   - Continue Coreg, hydralazine  - Monitor BP and titrate medications as needed      Elevated troponin  Has known CAD. Remote hx of MI. Hx of coronary stent. Elevated troponin upon admission here likely due to myocardial demand ischemia without infarction, in setting of accelerated HTN. No angina or CHF sx. TTE done. Normal LVEF. No regional wall motion abnormalities.   - Continue aspirin, statin, Coreg    Moderate mitral regurgitation  As noted on TTE  - Continue to monitor as outpatient     LIONEL  Cr 1.8 today. No urinary complaints. Etiology unclear. No hypotensive episodes. No IV contrast.   - Ordered for urine Na, Cr, prot/Cr ratio  - Is and Os  - Avoid nephrotoxic agents  - Trend Cr  - Check bladder scan post void to assess residual volume    HyperK  K 5.3. Lokelma ordered  - Trend K      Dispo: Pending Cr stabilization, shelter placement

## 2023-05-21 LAB
ALBUMIN SERPL ELPH-MCNC: 2.3 G/DL — LOW (ref 3.3–5)
ALP SERPL-CCNC: 78 U/L — SIGNIFICANT CHANGE UP (ref 40–120)
ALT FLD-CCNC: 17 U/L — SIGNIFICANT CHANGE UP (ref 12–78)
ANION GAP SERPL CALC-SCNC: 4 MMOL/L — LOW (ref 5–17)
ANION GAP SERPL CALC-SCNC: 5 MMOL/L — SIGNIFICANT CHANGE UP (ref 5–17)
APPEARANCE UR: CLEAR — SIGNIFICANT CHANGE UP
AST SERPL-CCNC: 14 U/L — LOW (ref 15–37)
BILIRUB SERPL-MCNC: 0.2 MG/DL — SIGNIFICANT CHANGE UP (ref 0.2–1.2)
BILIRUB UR-MCNC: NEGATIVE — SIGNIFICANT CHANGE UP
BUN SERPL-MCNC: 34 MG/DL — HIGH (ref 7–23)
BUN SERPL-MCNC: 36 MG/DL — HIGH (ref 7–23)
CALCIUM SERPL-MCNC: 7.4 MG/DL — LOW (ref 8.5–10.1)
CALCIUM SERPL-MCNC: 7.7 MG/DL — LOW (ref 8.5–10.1)
CHLORIDE SERPL-SCNC: 113 MMOL/L — HIGH (ref 96–108)
CHLORIDE SERPL-SCNC: 115 MMOL/L — HIGH (ref 96–108)
CO2 SERPL-SCNC: 25 MMOL/L — SIGNIFICANT CHANGE UP (ref 22–31)
CO2 SERPL-SCNC: 25 MMOL/L — SIGNIFICANT CHANGE UP (ref 22–31)
COLOR SPEC: YELLOW — SIGNIFICANT CHANGE UP
CREAT ?TM UR-MCNC: 22 MG/DL — SIGNIFICANT CHANGE UP
CREAT SERPL-MCNC: 1.65 MG/DL — HIGH (ref 0.5–1.3)
CREAT SERPL-MCNC: 1.68 MG/DL — HIGH (ref 0.5–1.3)
DIFF PNL FLD: NEGATIVE — SIGNIFICANT CHANGE UP
EGFR: 33 ML/MIN/1.73M2 — LOW
EGFR: 33 ML/MIN/1.73M2 — LOW
GLUCOSE SERPL-MCNC: 87 MG/DL — SIGNIFICANT CHANGE UP (ref 70–99)
GLUCOSE SERPL-MCNC: 98 MG/DL — SIGNIFICANT CHANGE UP (ref 70–99)
GLUCOSE UR QL: NEGATIVE — SIGNIFICANT CHANGE UP
KETONES UR-MCNC: NEGATIVE — SIGNIFICANT CHANGE UP
LEUKOCYTE ESTERASE UR-ACNC: NEGATIVE — SIGNIFICANT CHANGE UP
MAGNESIUM SERPL-MCNC: 2 MG/DL — SIGNIFICANT CHANGE UP (ref 1.6–2.6)
NITRITE UR-MCNC: NEGATIVE — SIGNIFICANT CHANGE UP
NT-PROBNP SERPL-SCNC: HIGH PG/ML (ref 0–125)
PH UR: 6 — SIGNIFICANT CHANGE UP (ref 5–8)
PHOSPHATE SERPL-MCNC: 4.8 MG/DL — HIGH (ref 2.5–4.5)
POTASSIUM SERPL-MCNC: 5.1 MMOL/L — SIGNIFICANT CHANGE UP (ref 3.5–5.3)
POTASSIUM SERPL-MCNC: 5.4 MMOL/L — HIGH (ref 3.5–5.3)
POTASSIUM SERPL-SCNC: 5.1 MMOL/L — SIGNIFICANT CHANGE UP (ref 3.5–5.3)
POTASSIUM SERPL-SCNC: 5.4 MMOL/L — HIGH (ref 3.5–5.3)
PROT ?TM UR-MCNC: 32 MG/DL — HIGH (ref 0–12)
PROT SERPL-MCNC: 6.5 GM/DL — SIGNIFICANT CHANGE UP (ref 6–8.3)
PROT UR-MCNC: 15
PROT/CREAT UR-RTO: 1.5 RATIO — HIGH (ref 0–0.2)
SODIUM SERPL-SCNC: 143 MMOL/L — SIGNIFICANT CHANGE UP (ref 135–145)
SODIUM SERPL-SCNC: 144 MMOL/L — SIGNIFICANT CHANGE UP (ref 135–145)
SODIUM UR-SCNC: 37 MMOL/L — SIGNIFICANT CHANGE UP
SP GR SPEC: 1 — LOW (ref 1.01–1.02)
TROPONIN I, HIGH SENSITIVITY RESULT: 17.22 NG/L — SIGNIFICANT CHANGE UP
UROBILINOGEN FLD QL: NEGATIVE — SIGNIFICANT CHANGE UP

## 2023-05-21 PROCEDURE — 99232 SBSQ HOSP IP/OBS MODERATE 35: CPT

## 2023-05-21 PROCEDURE — 99233 SBSQ HOSP IP/OBS HIGH 50: CPT

## 2023-05-21 RX ORDER — DIPHENHYDRAMINE HCL 50 MG
25 CAPSULE ORAL ONCE
Refills: 0 | Status: COMPLETED | OUTPATIENT
Start: 2023-05-21 | End: 2023-05-21

## 2023-05-21 RX ORDER — SODIUM ZIRCONIUM CYCLOSILICATE 10 G/10G
5 POWDER, FOR SUSPENSION ORAL ONCE
Refills: 0 | Status: COMPLETED | OUTPATIENT
Start: 2023-05-21 | End: 2023-05-21

## 2023-05-21 RX ORDER — SODIUM CHLORIDE 9 MG/ML
1000 INJECTION INTRAMUSCULAR; INTRAVENOUS; SUBCUTANEOUS ONCE
Refills: 0 | Status: COMPLETED | OUTPATIENT
Start: 2023-05-21 | End: 2023-05-21

## 2023-05-21 RX ORDER — SODIUM CHLORIDE 9 MG/ML
1000 INJECTION, SOLUTION INTRAVENOUS
Refills: 0 | Status: DISCONTINUED | OUTPATIENT
Start: 2023-05-21 | End: 2023-05-22

## 2023-05-21 RX ORDER — FAMOTIDINE 10 MG/ML
20 INJECTION INTRAVENOUS ONCE
Refills: 0 | Status: COMPLETED | OUTPATIENT
Start: 2023-05-21 | End: 2023-05-21

## 2023-05-21 RX ORDER — SODIUM CHLORIDE 9 MG/ML
1000 INJECTION INTRAMUSCULAR; INTRAVENOUS; SUBCUTANEOUS
Refills: 0 | Status: DISCONTINUED | OUTPATIENT
Start: 2023-05-21 | End: 2023-05-22

## 2023-05-21 RX ADMIN — ONDANSETRON 4 MILLIGRAM(S): 8 TABLET, FILM COATED ORAL at 06:37

## 2023-05-21 RX ADMIN — PANTOPRAZOLE SODIUM 40 MILLIGRAM(S): 20 TABLET, DELAYED RELEASE ORAL at 05:38

## 2023-05-21 RX ADMIN — Medication 25 MILLIGRAM(S): at 21:09

## 2023-05-21 RX ADMIN — ATORVASTATIN CALCIUM 10 MILLIGRAM(S): 80 TABLET, FILM COATED ORAL at 20:21

## 2023-05-21 RX ADMIN — SODIUM CHLORIDE 200 MILLILITER(S): 9 INJECTION, SOLUTION INTRAVENOUS at 09:11

## 2023-05-21 RX ADMIN — SODIUM ZIRCONIUM CYCLOSILICATE 5 GRAM(S): 10 POWDER, FOR SUSPENSION ORAL at 09:11

## 2023-05-21 RX ADMIN — CARVEDILOL PHOSPHATE 25 MILLIGRAM(S): 80 CAPSULE, EXTENDED RELEASE ORAL at 20:21

## 2023-05-21 RX ADMIN — SODIUM CHLORIDE 1000 MILLILITER(S): 9 INJECTION INTRAMUSCULAR; INTRAVENOUS; SUBCUTANEOUS at 20:31

## 2023-05-21 RX ADMIN — Medication 0.5 MILLIGRAM(S): at 09:11

## 2023-05-21 RX ADMIN — Medication 81 MILLIGRAM(S): at 09:12

## 2023-05-21 RX ADMIN — GABAPENTIN 300 MILLIGRAM(S): 400 CAPSULE ORAL at 20:21

## 2023-05-21 RX ADMIN — Medication 25 MILLIGRAM(S): at 23:41

## 2023-05-21 RX ADMIN — Medication 25 MILLIGRAM(S): at 17:30

## 2023-05-21 RX ADMIN — FAMOTIDINE 20 MILLIGRAM(S): 10 INJECTION INTRAVENOUS at 21:10

## 2023-05-21 RX ADMIN — CARVEDILOL PHOSPHATE 25 MILLIGRAM(S): 80 CAPSULE, EXTENDED RELEASE ORAL at 09:12

## 2023-05-21 RX ADMIN — Medication 25 MILLIGRAM(S): at 14:32

## 2023-05-21 RX ADMIN — Medication 25 MILLIGRAM(S): at 05:34

## 2023-05-21 RX ADMIN — ONDANSETRON 4 MILLIGRAM(S): 8 TABLET, FILM COATED ORAL at 15:48

## 2023-05-21 NOTE — PROGRESS NOTE ADULT - ASSESSMENT
67 yo woman with CAD, hx of MI, chronic pain, osteoarthritis, not on medication for past several weeks as she was unable to be seen by her PCP, is now undomiciled, sent to ED from new PCP office when she was noted there to have markedly elevated blood pressures. In the ED here, BP elevated. Troponin minimally elevated. BNP 29,000. EKG without acute ischemic changes. CXR clear. CT head w/ old lacunar infarct. Admitted to Medicine.     HTN emergency  Uncontrolled HTN upon presentation due to lapse in Rx availability. Coreg and hydralazine have been resumed and blood pressure is improving. Rather asymptomatic at this point.   - Continue Coreg, hydralazine  - Monitor BP and titrate medications as needed      Elevated troponin  Has known CAD. Remote hx of MI. Hx of coronary stent. Elevated troponin upon admission here likely due to myocardial demand ischemia without infarction, in setting of accelerated HTN. No angina or CHF sx. TTE done. Normal LVEF. No regional wall motion abnormalities.   - Continue aspirin, statin, Coreg    Moderate mitral regurgitation  As noted on TTE  - Continue to monitor as outpatient     LIONEL  Cr 1.8 yesterday. No urinary complaints. Etiology unclear. No hypotensive episodes. No IV contrast. UA negative. FENa 1.98%. UPCR 1.5.   - Bladder scan to check post void residual volume  - Gentle IV fluid hydration  - Continue to trend Cr  - Is and Os  - Avoid nephrotoxic agents    HyperK  K 5.1. Lokelma ordered  - Trend K      Dispo: Pending Cr stabilization, shelter placement

## 2023-05-21 NOTE — PROGRESS NOTE ADULT - PROBLEM SELECTOR PLAN 1
Blood pressure improved  CW current medications  Creat improving  Will sign off and follow PRN
Uncontrolled HTN due to lapse in Rx availability.  Coreg and hydralazine have been resumed and  blood pressure is improving will continue to monitor and titrate medications as needed    Serum creat rising have FABRICE RN who will alert Hospitalist

## 2023-05-21 NOTE — PROGRESS NOTE ADULT - SUBJECTIVE AND OBJECTIVE BOX
Chief Complaint: Sent to ED for elevated blood pressure    Interval Hx: Patient reports feeling okay, eager to be discharged as BP control has improved. However, patient is planned for discharge to shelter and anticipate that discharge might not occur until Tuesday 5/23. Also, patient is noted yesterday on labs to have increase in Cr, now 1.8, etiology for this quite unclear. UA negative. FENa 1.9%. Ordered for bladder scan to check post void residual volume, pending. Today Cr is somewhat improved, now 1.68.     ROS: Multi system review is comprehensively negative x 10 systems    Vitals:   T(F): 98.1 (21 May 2023 08:06), Max: 98.3 (20 May 2023 20:00)  HR: 72 (21 May 2023 14:35) (69 - 74)  BP: 147/69 (21 May 2023 14:35) (134/50 - 182/79)  RR: 18 (21 May 2023 08:06) (18 - 18)  SpO2: 93% (21 May 2023 08:06) (93% - 94%) on room air    Exam:  Gen: Comfortable  HEENT: NCAT PERRL EOMI MMM clear oropharynx  Neck: Supple, no JVD, no LAD, no thyromegaly  CVS: s1 s2 normal, RRR  Chest: Normal resp effort, lungs CTA B/L  Abd: +BS, soft NT ND   Ext: No edema or calf tenderness  Skin: Warm, dry  Mood: Calm, pleasant  Neuro: Awake and alert, answers questions appropriately, follows commands, no gross deficits    Labs:                        8.0    7.59  )--------( 446             26.2       Iron 25  TIBC 377  Iron Sat 7%    143  |  113  |  36  ------------------------<  87  5.1   |   25   |  1.68    Ca 7.7 (WNL when corrected for albumin)  Mg 1.8    TPro  8.2  /  Alb  2.9  /  TBili  0.3  /  DBili  x   /  AST  19  /  ALT  25  /  AlkPhos  111      Troponin 61, 44   proBNP 29,521    FENa 1.98%   UPCR 1.5    Urinalysis 5/21: Yellow, clear, ket neg, prot 15, N neg, LE neg, RBC 3-5, WBC 0-2, bact occ  Urinalysis 5/18: Yellow, clear, ket neg, prot 30, N neg, LE neg, RBC 0-2, WBC 0-2, bact few    Imaging:  CT head 5/18: Parenchymal volume loss and chronic microvascular ischemic changes are identified Old lacunar infarct involving the right basal ganglia region is identified. Evaluation of the osseous appropriate window appears unremarkable The visualized paranasal sinuses appear clear Opacification of the right mastoid region is seen which compatible with underlying inflammatory change.    CXR 5/18: The visualized lungs are clear of airspace consolidations or pleural effusions. No pneumothorax. The heart and mediastinum size and configuration are within normal limits. Visualized osseous thorax intact.    Cardiac Testing:  Tele 5/19-20: NSR, rate 80s-90s    TTE 5/19: Mild concentric left ventricular hypertrophy is present. Estimated left ventricular ejection fraction is 55-60 %. The left atrium is mildly dilated. Mild aortic sclerosis is present with normal valvular opening. Moderate (2+) mitral regurgitation is present. EA reversal of the mitral inflow consistent with reduced compliance of the left ventricle.    EKG 5/18: Rate 97. SR. LVH.     Meds:  MEDICATIONS  (STANDING):  aspirin enteric coated 81 milliGRAM(s) Oral daily  atorvastatin 10 milliGRAM(s) Oral at bedtime  carvedilol 25 milliGRAM(s) Oral every 12 hours  dextrose 5% + sodium chloride 0.45%. 1000 milliLiter(s) (200 mL/Hr) IV Continuous <Continuous>  hydrALAZINE 25 milliGRAM(s) Oral four times a day  pantoprazole    Tablet 40 milliGRAM(s) Oral before breakfast    MEDICATIONS  (PRN):  acetaminophen     Tablet .. 650 milliGRAM(s) Oral every 6 hours PRN Temp greater or equal to 38C (100.4F), Mild Pain (1 - 3)  ALPRAZolam 0.5 milliGRAM(s) Oral two times a day PRN Anxiety  aluminum hydroxide/magnesium hydroxide/simethicone Suspension 30 milliLiter(s) Oral every 4 hours PRN Dyspepsia  gabapentin 300 milliGRAM(s) Oral four times a day PRN pain  melatonin 3 milliGRAM(s) Oral at bedtime PRN Insomnia  ondansetron   Disintegrating Tablet 4 milliGRAM(s) Oral every 8 hours PRN Nausea and/or Vomiting  ondansetron Injectable 4 milliGRAM(s) IV Push every 8 hours PRN Nausea and/or Vomiting  oxycodone    5 mG/acetaminophen 325 mG 1 Tablet(s) Oral every 4 hours PRN Moderate Pain (4 - 6)

## 2023-05-21 NOTE — PROGRESS NOTE ADULT - SUBJECTIVE AND OBJECTIVE BOX
CHIEF COMPLAINT: "I haven't had any medications."    HPI:  68 year old undomiciled woman with a history of CAD, MI (~ 5 years ago), coronary stent (details not known; placed while living in [place]), chronic pain, osteoarthritis, and recent cholecystectomy (March/April 2023) who was referred to the ED after establishing care with Dr Aguayo yesterday and found to have a severely elevated blood pressure.  She is living in a shelter and has not had any medications in several weeks.  However she has been feeling well; no angina, dyspnea palpitations, headache.  She offers no complaints at time of encounter.  She was previously prescribed Coreg and hydralazine for the management of her BP.    5/20/23 awake alert no complaints tele SR   5/21/23: sitting up in bed feeling well no complaints tel SR      MEDICATIONS  (STANDING):  aspirin enteric coated 81 milliGRAM(s) Oral daily  atorvastatin 10 milliGRAM(s) Oral at bedtime  carvedilol 25 milliGRAM(s) Oral every 12 hours  dextrose 5% + sodium chloride 0.45%. 1000 milliLiter(s) (200 mL/Hr) IV Continuous <Continuous>  hydrALAZINE 25 milliGRAM(s) Oral four times a day  pantoprazole    Tablet 40 milliGRAM(s) Oral before breakfast    MEDICATIONS  (PRN):  acetaminophen     Tablet .. 650 milliGRAM(s) Oral every 6 hours PRN Temp greater or equal to 38C (100.4F), Mild Pain (1 - 3)  ALPRAZolam 0.5 milliGRAM(s) Oral two times a day PRN Anxiety  aluminum hydroxide/magnesium hydroxide/simethicone Suspension 30 milliLiter(s) Oral every 4 hours PRN Dyspepsia  gabapentin 300 milliGRAM(s) Oral four times a day PRN pain  melatonin 3 milliGRAM(s) Oral at bedtime PRN Insomnia  ondansetron   Disintegrating Tablet 4 milliGRAM(s) Oral every 8 hours PRN Nausea and/or Vomiting  ondansetron Injectable 4 milliGRAM(s) IV Push every 8 hours PRN Nausea and/or Vomiting  oxycodone    5 mG/acetaminophen 325 mG 1 Tablet(s) Oral every 4 hours PRN Moderate Pain (4 - 6)        Vital Signs Last 24 Hrs  T(C): 36.7 (21 May 2023 08:06), Max: 36.8 (20 May 2023 20:00)  T(F): 98.1 (21 May 2023 08:06), Max: 98.3 (20 May 2023 20:00)  HR: 73 (21 May 2023 08:06) (69 - 74)  BP: 134/50 (21 May 2023 08:06) (134/50 - 182/79)  BP(mean): --  RR: 18 (21 May 2023 08:06) (18 - 18)  SpO2: 93% (21 May 2023 08:06) (93% - 94%)    Parameters below as of 21 May 2023 08:06  Patient On (Oxygen Delivery Method): room air          PHYSICAL EXAM:  Constitutional: NAD, awake and alert, appears older than stated age, frail   HEENT:  No oral cyanosis.  Pulmonary:  breath sounds are clear bilaterally  Cardiovascular: S1 and S2, regular + MADELYN  Gastrointestinal: Bowel Sounds present, soft, nontender.   Lymph: No peripheral edema.   Neurological: Alert, no focal deficits  Skin: Warm, dry  Psych:  Mood & affect appropriate    LABS:               8.0    7.59  )-----------( 446      ( 19 May 2023 06:30 )             26.2              143    |  113    |  28     ----------------------------<  93     5.5     |  24     |  1.29     Ca    7.6        19 May 2023 06:30  Mg     1.8       18 May 2023 14:08    TPro  8.2    /  Alb  2.9    /  TBili  0.3    /  DBili  x      /  AST  19     /  ALT  25     /  AlkPhos  111    18 May 2023 14:08    TroponinI hsT: 44.35, 61.25, 61.16    ECG: NSR    CT Head No Cont (05.18.23 @ 15:09):  Old lacunar infarct involving the right basal ganglia region is identified.  Evaluation of the osseous appropriate window appears unremarkable  The visualized paranasal sinuses appear clear  Opacification of the right mastoid region is seen which compatible with underlying inflammatory change.    Tele: SR    Echo   Mild concentric left ventricular hypertrophy is present.   Estimated left ventricular ejection fraction is 55-60 %.   The left atrium is mildly dilated.   Mild aortic sclerosis is present with normal valvular opening.   Moderate (2+) mitral regurgitation is present.   EA reversal of the mitral inflow consistent with reduced compliance of the left ventricle.                 CHIEF COMPLAINT: "I haven't had any medications."    HPI:  68 year old undomiciled woman with a history of CAD, MI (~ 5 years ago), coronary stent (details not known; placed while living in [place]), chronic pain, osteoarthritis, and recent cholecystectomy (March/April 2023) who was referred to the ED after establishing care with Dr Aguayo yesterday and found to have a severely elevated blood pressure.  She is living in a shelter and has not had any medications in several weeks.  However she has been feeling well; no angina, dyspnea palpitations, headache.  She offers no complaints at time of encounter.  She was previously prescribed Coreg and hydralazine for the management of her BP.      5/20/23 awake alert no complaints tele SR   5/21/23: sitting up in bed feeling well no complaints tel SR      MEDICATIONS  (STANDING):  aspirin enteric coated 81 milliGRAM(s) Oral daily  atorvastatin 10 milliGRAM(s) Oral at bedtime  carvedilol 25 milliGRAM(s) Oral every 12 hours  dextrose 5% + sodium chloride 0.45%. 1000 milliLiter(s) (200 mL/Hr) IV Continuous <Continuous>  hydrALAZINE 25 milliGRAM(s) Oral four times a day  pantoprazole    Tablet 40 milliGRAM(s) Oral before breakfast    MEDICATIONS  (PRN):  acetaminophen     Tablet .. 650 milliGRAM(s) Oral every 6 hours PRN Temp greater or equal to 38C (100.4F), Mild Pain (1 - 3)  ALPRAZolam 0.5 milliGRAM(s) Oral two times a day PRN Anxiety  aluminum hydroxide/magnesium hydroxide/simethicone Suspension 30 milliLiter(s) Oral every 4 hours PRN Dyspepsia  gabapentin 300 milliGRAM(s) Oral four times a day PRN pain  melatonin 3 milliGRAM(s) Oral at bedtime PRN Insomnia  ondansetron   Disintegrating Tablet 4 milliGRAM(s) Oral every 8 hours PRN Nausea and/or Vomiting  ondansetron Injectable 4 milliGRAM(s) IV Push every 8 hours PRN Nausea and/or Vomiting  oxycodone    5 mG/acetaminophen 325 mG 1 Tablet(s) Oral every 4 hours PRN Moderate Pain (4 - 6)        Vital Signs Last 24 Hrs  T(C): 36.7 (21 May 2023 08:06), Max: 36.8 (20 May 2023 20:00)  T(F): 98.1 (21 May 2023 08:06), Max: 98.3 (20 May 2023 20:00)  HR: 73 (21 May 2023 08:06) (69 - 74)  BP: 134/50 (21 May 2023 08:06) (134/50 - 182/79)  BP(mean): --  RR: 18 (21 May 2023 08:06) (18 - 18)  SpO2: 93% (21 May 2023 08:06) (93% - 94%)    Parameters below as of 21 May 2023 08:06  Patient On (Oxygen Delivery Method): room air          PHYSICAL EXAM:  Constitutional: NAD, awake and alert, appears older than stated age, frail   HEENT:  No oral cyanosis.  Pulmonary:  breath sounds are clear bilaterally  Cardiovascular: S1 and S2, regular + MADELYN  Gastrointestinal: Bowel Sounds present, soft, nontender.   Lymph: No peripheral edema.   Neurological: Alert, no focal deficits  Skin: Warm, dry  Psych:  Mood & affect appropriate    LABS:               8.0    7.59  )-----------( 446      ( 19 May 2023 06:30 )             26.2              143    |  113    |  28     ----------------------------<  93     5.5     |  24     |  1.29     Ca    7.6        19 May 2023 06:30  Mg     1.8       18 May 2023 14:08    TPro  8.2    /  Alb  2.9    /  TBili  0.3    /  DBili  x      /  AST  19     /  ALT  25     /  AlkPhos  111    18 May 2023 14:08    TroponinI hsT: 44.35, 61.25, 61.16    ECG: NSR    CT Head No Cont (05.18.23 @ 15:09):  Old lacunar infarct involving the right basal ganglia region is identified.  Evaluation of the osseous appropriate window appears unremarkable  The visualized paranasal sinuses appear clear  Opacification of the right mastoid region is seen which compatible with underlying inflammatory change.    Tele: SR    Echo   Mild concentric left ventricular hypertrophy is present.   Estimated left ventricular ejection fraction is 55-60 %.   The left atrium is mildly dilated.   Mild aortic sclerosis is present with normal valvular opening.   Moderate (2+) mitral regurgitation is present.   EA reversal of the mitral inflow consistent with reduced compliance of the left ventricle.

## 2023-05-21 NOTE — PROGRESS NOTE ADULT - NS ATTEND AMEND GEN_ALL_CORE FT
BP is improving; continue management as described above -- may need titration of Rx regimen in outpatient setting upon discharge.
BP is improving -- would titrate Rx in 24 hours if not optimally controlled.

## 2023-05-21 NOTE — CHART NOTE - NSCHARTNOTEFT_GEN_A_CORE
S: Patient seen and examined. She mentions chronic history of generalized pruritus. As per patient this has been happening for a long time and she even scratches at night without being aware. She says her rear end is the worst, and currently she is scratching her head. She says she has been living in a shelter and is worried about losing her bed at the shelter. She denies any headaches, vision changes, chest pain or dyspnea at this time. patient does mention nausea. She denies hx of acute sob or anaphylaxis, says she has allergies to penicillin.     O: Vitals  T(F): 98.1 (05-21-23 @ 08:06), Max: 98.1 (05-21-23 @ 08:06)  HR: 76 (05-21-23 @ 17:34) (69 - 76)  BP: 141/48 (05-21-23 @ 17:34) (134/50 - 182/79)  RR: 18 (05-21-23 @ 08:06) (18 - 18)  SpO2: 93% (05-21-23 @ 08:06) (93% - 94%)    Physical Exam   Gen: NAD, cachectic, hair matted   HENT: atraumatic head and ears, no gross abnormalities of ears, mucous membranes moist, no oral lesions, neck supple without masses/goiter/lymphadenopathy  CV: RRR, nl s1/s2, no M/R/G  Pulm: nl respiratory effort, CTABL, no wheezes/crackles/rhonchi  Back: no scoliosis, lordosis, or kyphosis, no tenderness  Abd: normoactive bowel sounds in all 4 quadrants, soft, nontender, nondistended, no rebound, no guarding, no masses  Extremities: no pedal edema, pedal pulses palpable   Skin: nl warm and dry, excoriations noted on right calf, and also gluteus region.   Neuro: A&Ox3, answering questions appropriately, PERRL, EOMI, face symmetric, sensation equal bilaterally in face, tongue midline, no dysarthria, 5/5 strength in upper and lower extremities bilaterally, sensation intact in upper and lower extremities bilaterally       #A/P  patient with recent hypertensive emergency   on hydralazine and carvedilol- BP has been within normal limits   CMP, troponin, bnp   IV NS bolus now  EF wnl as per echo   concern for intravascular depletion  Generalized pruritus- patient has no marks in intertrignous areas, likely chronic excoriations   as per patient benadryl works best  Shower tonight, benadryl x1  continue loratadine BID  Recheck BP in one hour    Continue to monitor as per primary team Statement Selected

## 2023-05-21 NOTE — PROGRESS NOTE ADULT - PROBLEM SELECTOR PLAN 2
s/p remote MI treated with coronary stent; minimal elevation of troponin trended down now normalized and likely related to severe HTN rather than ACS.   No CP/SOB   Continue ASA/Statin/BB  MR: Moderate by Echo with NL LV FX would advise periodic surveillance
s/p remote MI treated with coronary stent; minimal elevation of troponin trended down now normalized and likely related to severe HTN rather than ACS.   No CP/SOB   Continue ASA/Statin/BB  MR: Moderate by Echo with NL LV FX would advise periodic surveillance

## 2023-05-22 ENCOUNTER — TRANSCRIPTION ENCOUNTER (OUTPATIENT)
Age: 68
End: 2023-05-22

## 2023-05-22 LAB
ANION GAP SERPL CALC-SCNC: 6 MMOL/L — SIGNIFICANT CHANGE UP (ref 5–17)
BUN SERPL-MCNC: 32 MG/DL — HIGH (ref 7–23)
CALCIUM SERPL-MCNC: 7.5 MG/DL — LOW (ref 8.5–10.1)
CHLORIDE SERPL-SCNC: 115 MMOL/L — HIGH (ref 96–108)
CO2 SERPL-SCNC: 24 MMOL/L — SIGNIFICANT CHANGE UP (ref 22–31)
CREAT SERPL-MCNC: 1.57 MG/DL — HIGH (ref 0.5–1.3)
EGFR: 35 ML/MIN/1.73M2 — LOW
GLUCOSE SERPL-MCNC: 91 MG/DL — SIGNIFICANT CHANGE UP (ref 70–99)
POTASSIUM SERPL-MCNC: 4.9 MMOL/L — SIGNIFICANT CHANGE UP (ref 3.5–5.3)
POTASSIUM SERPL-SCNC: 4.9 MMOL/L — SIGNIFICANT CHANGE UP (ref 3.5–5.3)
SODIUM SERPL-SCNC: 145 MMOL/L — SIGNIFICANT CHANGE UP (ref 135–145)

## 2023-05-22 PROCEDURE — 99233 SBSQ HOSP IP/OBS HIGH 50: CPT

## 2023-05-22 PROCEDURE — 71046 X-RAY EXAM CHEST 2 VIEWS: CPT | Mod: 26

## 2023-05-22 PROCEDURE — 99232 SBSQ HOSP IP/OBS MODERATE 35: CPT

## 2023-05-22 RX ORDER — NICOTINE POLACRILEX 2 MG
1 GUM BUCCAL DAILY
Refills: 0 | Status: DISCONTINUED | OUTPATIENT
Start: 2023-05-22 | End: 2023-05-25

## 2023-05-22 RX ORDER — CEFEPIME 1 G/1
INJECTION, POWDER, FOR SOLUTION INTRAMUSCULAR; INTRAVENOUS
Refills: 0 | Status: DISCONTINUED | OUTPATIENT
Start: 2023-05-22 | End: 2023-05-24

## 2023-05-22 RX ORDER — CEFEPIME 1 G/1
2000 INJECTION, POWDER, FOR SOLUTION INTRAMUSCULAR; INTRAVENOUS EVERY 12 HOURS
Refills: 0 | Status: DISCONTINUED | OUTPATIENT
Start: 2023-05-23 | End: 2023-05-24

## 2023-05-22 RX ORDER — CEFEPIME 1 G/1
2000 INJECTION, POWDER, FOR SOLUTION INTRAMUSCULAR; INTRAVENOUS ONCE
Refills: 0 | Status: COMPLETED | OUTPATIENT
Start: 2023-05-22 | End: 2023-05-22

## 2023-05-22 RX ORDER — THIAMINE MONONITRATE (VIT B1) 100 MG
100 TABLET ORAL DAILY
Refills: 0 | Status: DISCONTINUED | OUTPATIENT
Start: 2023-05-22 | End: 2023-05-25

## 2023-05-22 RX ORDER — MULTIVIT-MIN/FERROUS GLUCONATE 9 MG/15 ML
1 LIQUID (ML) ORAL DAILY
Refills: 0 | Status: DISCONTINUED | OUTPATIENT
Start: 2023-05-22 | End: 2023-05-25

## 2023-05-22 RX ORDER — DIPHENHYDRAMINE HCL 50 MG
25 CAPSULE ORAL EVERY 6 HOURS
Refills: 0 | Status: DISCONTINUED | OUTPATIENT
Start: 2023-05-22 | End: 2023-05-25

## 2023-05-22 RX ORDER — ISOSORBIDE MONONITRATE 60 MG/1
30 TABLET, EXTENDED RELEASE ORAL DAILY
Refills: 0 | Status: DISCONTINUED | OUTPATIENT
Start: 2023-05-22 | End: 2023-05-25

## 2023-05-22 RX ADMIN — CARVEDILOL PHOSPHATE 25 MILLIGRAM(S): 80 CAPSULE, EXTENDED RELEASE ORAL at 11:02

## 2023-05-22 RX ADMIN — Medication 81 MILLIGRAM(S): at 11:02

## 2023-05-22 RX ADMIN — ATORVASTATIN CALCIUM 10 MILLIGRAM(S): 80 TABLET, FILM COATED ORAL at 22:05

## 2023-05-22 RX ADMIN — CEFEPIME 100 MILLIGRAM(S): 1 INJECTION, POWDER, FOR SOLUTION INTRAMUSCULAR; INTRAVENOUS at 23:39

## 2023-05-22 RX ADMIN — Medication 25 MILLIGRAM(S): at 23:39

## 2023-05-22 RX ADMIN — Medication 25 MILLIGRAM(S): at 11:03

## 2023-05-22 RX ADMIN — PANTOPRAZOLE SODIUM 40 MILLIGRAM(S): 20 TABLET, DELAYED RELEASE ORAL at 05:37

## 2023-05-22 RX ADMIN — Medication 1 PATCH: at 06:32

## 2023-05-22 RX ADMIN — Medication 1 PATCH: at 23:51

## 2023-05-22 RX ADMIN — Medication 650 MILLIGRAM(S): at 11:07

## 2023-05-22 RX ADMIN — Medication 650 MILLIGRAM(S): at 12:07

## 2023-05-22 RX ADMIN — Medication 0.5 MILLIGRAM(S): at 13:09

## 2023-05-22 RX ADMIN — Medication 0.5 MILLIGRAM(S): at 05:37

## 2023-05-22 RX ADMIN — Medication 25 MILLIGRAM(S): at 23:38

## 2023-05-22 RX ADMIN — ONDANSETRON 4 MILLIGRAM(S): 8 TABLET, FILM COATED ORAL at 11:02

## 2023-05-22 RX ADMIN — Medication 100 MILLIGRAM(S): at 22:05

## 2023-05-22 RX ADMIN — Medication 1 PATCH: at 07:43

## 2023-05-22 RX ADMIN — Medication 25 MILLIGRAM(S): at 17:44

## 2023-05-22 RX ADMIN — ONDANSETRON 4 MILLIGRAM(S): 8 TABLET, FILM COATED ORAL at 22:49

## 2023-05-22 RX ADMIN — Medication 25 MILLIGRAM(S): at 05:37

## 2023-05-22 RX ADMIN — CARVEDILOL PHOSPHATE 25 MILLIGRAM(S): 80 CAPSULE, EXTENDED RELEASE ORAL at 22:04

## 2023-05-22 NOTE — DIETITIAN NUTRITION RISK NOTIFICATION - ADDITIONAL COMMENTS/DIETITIAN RECOMMENDATIONS
Liberalize diet to 2 Gm Na.  Record PO intake in EMR after each meal (nursing.)  Record PO intake in EMR after each meal (nursing.)   MVI w/ minerals daily to ensure 100% RDA met   Consider adding thiamine 100 mg daily 2/2 poor PO intake/ malnutrition   Monitor bowel movements, if no BM for >3 days, consider implementing bowel regimen.   Ensure plus tid. Confirm Goals of Care regarding nutrition support.   Monitor PO intake, tolerance, labs and weight.

## 2023-05-22 NOTE — DIETITIAN INITIAL EVALUATION ADULT - FACTORS AFF FOOD INTAKE
pt from shelter, but says she eats well./difficulty with food procurement/preparation/persistent lack of appetite

## 2023-05-22 NOTE — DISCHARGE NOTE NURSING/CASE MANAGEMENT/SOCIAL WORK - NSDCFUADDAPPT_GEN_ALL_CORE_FT
follow up with dr. Susy BELLE wednesday 5/31/23 at 10:30am  follow up with dr. Susy BELLE wednesday 5/31/23 at 10:30am     Gavi Aguayo DO   [address] [place] [address], [id number] [phone]  Appointment: Thursday June 1, 2023 9:40am

## 2023-05-22 NOTE — DIETITIAN INITIAL EVALUATION ADULT - OTHER INFO
67 yo Female wPMHx of HTN, presented to the ED with elevated BP. Patient run out of medication in recent months as she has no insurance. Patient has been living in a shelter for a month after coming back from [place]. Her insurance did not cover her medication and primary care doctor. Patient Denies chest pain and SI/HI. No other complain, No chest pain, No Dizziness. No headache. No weakness.  67 yo Female wPMHx of HTN, presented to the ED with elevated BP. Patient run out of medication in recent months as she has no insurance. Patient has been living in a shelter for a month after coming back from [place]. Her insurance did not cover her medication and primary care doctor. Patient Denies chest pain and SI/HI. No other complain, No chest pain, No Dizziness. No headache. No weakness.     Admit dx is   Hypertensive Emergency  Pt became agitated during interview, as she was very concerned about where she was going after discharge  Pt reports wt los of 25 pounds but gives a long but non-specific timeline  NFPE reveals severe muscle wasting, fat wasting   PO intake estimated < 75% ENN > one month   Confirm Goals of Care regarding nutrition support   Glucose WNL  Recommendations to follow in Plan/Intervention

## 2023-05-22 NOTE — PROVIDER CONTACT NOTE (OTHER) - SITUATION
MD Rollins in pt room when I was taking repeat BP after PO dose of 25 mg of hydralazine.
Pt observed to be scratching to the point of drawing blood. Pt noted to have rash on chest
Spoke to Adelaide at service is aware of admission

## 2023-05-22 NOTE — PROVIDER CONTACT NOTE (OTHER) - ASSESSMENT
Pt states that she often feels itching when she sleeps. Pt noted to have several scratches and scabs from scratching
BP still elevated. 190/81.

## 2023-05-22 NOTE — DISCHARGE NOTE NURSING/CASE MANAGEMENT/SOCIAL WORK - NSDPDISTOALTCARESITEOTHER_GEN_ALL_CORE_FT
[facility] at [address] [place] [address] [facility]

## 2023-05-22 NOTE — DIETITIAN INITIAL EVALUATION ADULT - NAME AND PHONE
Kristy Monaco RDN, CDN, [facility]      [phone]   [email or web address]

## 2023-05-22 NOTE — DISCHARGE NOTE NURSING/CASE MANAGEMENT/SOCIAL WORK - NSDCPEFALRISK_GEN_ALL_CORE
For information on Fall & Injury Prevention, visit: [email or web address] OR  [email or web address] OR  https://www.cdc.gov/steadi/patient.html

## 2023-05-22 NOTE — PROGRESS NOTE ADULT - NUTRITIONAL ASSESSMENT
This patient has been assessed with a concern for Malnutrition and has been determined to have a diagnosis/diagnoses of Severe protein-calorie malnutrition.    This patient is being managed with:   Diet Regular-  Entered: May 19 2023  2:03AM

## 2023-05-22 NOTE — DIETITIAN INITIAL EVALUATION ADULT - ORAL INTAKE PTA/DIET HISTORY
Pt lives in a shelter, says she eats three meals a day, however PO intake estimated < 75% ENN > one month.  Pt reports occasional lack of appetite.

## 2023-05-22 NOTE — DISCHARGE NOTE NURSING/CASE MANAGEMENT/SOCIAL WORK - PATIENT PORTAL LINK FT
You can access the FollowMyHealth Patient Portal offered by [facility] by registering at the following website: [email or web address]. By joining [facility]’s FollowMyHealth portal, you will also be able to view your health information using other applications (apps) compatible with our system.

## 2023-05-22 NOTE — PROGRESS NOTE ADULT - SUBJECTIVE AND OBJECTIVE BOX
Chief Complaint: Sent to ED for elevated blood pressure    Interval Hx: Patient reports bit of headache and nausea this AM. No other associated complaints. BP 140s/60s. HR 60s. Afebrile. She is on supplemental oxygen today per her request but denies pulmonary complaints. SPO2 95%. CXR obtained to evaluate further. She has a new R base infiltrate. Started on cefepime and doxycycline.     ROS: Multi system review is comprehensively negative x 10 systems    Vitals:   T(F): 98.5 (22 May 2023 15:42), Max: 98.5 (22 May 2023 15:42)  HR: 69 (22 May 2023 15:42) (64 - 72)  BP: 149/61 (22 May 2023 15:42) (149/61 - 194/89)  RR: 18 (22 May 2023 15:42) (17 - 18)  SpO2: 95% (22 May 2023 15:42) (94% - 96%) on 2L/min    Exam:  Gen: Comfortable  HEENT: NCAT PERRL EOMI MMM clear oropharynx  Neck: Supple, no JVD, no LAD, no thyromegaly  CVS: s1 s2 normal, RRR  Chest: Normal resp effort, lungs CTA B/L  Abd: +BS, soft NT ND   Ext: No edema or calf tenderness  Skin: Warm, dry  Mood: Calm, pleasant  Neuro: Awake and alert, answers questions appropriately, follows commands, no gross deficits    Labs:                        8.0    7.59  )--------( 446             26.2       Iron 25  TIBC 377  Iron Sat 7%    145  |  115  |  32  ------------------------<  91  4.9   |   24   |  1.57    Ca 7.5 (WNL when corrected for albumin)  Mg 1.8    TPro  8.2  /  Alb  2.9  /  TBili  0.3  /  DBili  x   /  AST  19  /  ALT  25  /  AlkPhos  111      Troponin 61, 44   proBNP 29,521 --> 10,631    FENa 1.98%   UPCR 1.5    Urinalysis 5/21: Yellow, clear, ket neg, prot 15, N neg, LE neg, RBC 3-5, WBC 0-2, bact occ  Urinalysis 5/18: Yellow, clear, ket neg, prot 30, N neg, LE neg, RBC 0-2, WBC 0-2, bact few    Imaging:  CXR 5/22: Heart size normal. Left shoulder replacement again noted. Present film shows infiltrate at right base laterally new since May 18.    CT head 5/18: Parenchymal volume loss and chronic microvascular ischemic changes are identified Old lacunar infarct involving the right basal ganglia region is identified. Evaluation of the osseous appropriate window appears unremarkable The visualized paranasal sinuses appear clear Opacification of the right mastoid region is seen which compatible with underlying inflammatory change.    CXR 5/18: The visualized lungs are clear of airspace consolidations or pleural effusions. No pneumothorax. The heart and mediastinum size and configuration are within normal limits. Visualized osseous thorax intact.    Cardiac Testing:  Tele 5/19-20: NSR, rate 80s-90s    TTE 5/19: Mild concentric left ventricular hypertrophy is present. Estimated left ventricular ejection fraction is 55-60 %. The left atrium is mildly dilated. Mild aortic sclerosis is present with normal valvular opening. Moderate (2+) mitral regurgitation is present. EA reversal of the mitral inflow consistent with reduced compliance of the left ventricle.    EKG 5/18: Rate 97. SR. LVH.     Meds:  MEDICATIONS  (STANDING):  aspirin enteric coated 81 milliGRAM(s) Oral daily  atorvastatin 10 milliGRAM(s) Oral at bedtime  carvedilol 25 milliGRAM(s) Oral every 12 hours  cefepime 2000 milliGRAM(s) IVPB every 12 hours      doxycycline monohydrate Capsule 100 milliGRAM(s) Oral every 12 hours  hydrALAZINE 25 milliGRAM(s) Oral four times a day  nicotine - 21 mG/24Hr(s) Patch 1 Patch Transdermal daily  pantoprazole    Tablet 40 milliGRAM(s) Oral before breakfast    MEDICATIONS  (PRN):  acetaminophen     Tablet .. 650 milliGRAM(s) Oral every 6 hours PRN Temp greater or equal to 38C (100.4F), Mild Pain (1 - 3)  ALPRAZolam 0.5 milliGRAM(s) Oral two times a day PRN Anxiety  aluminum hydroxide/magnesium hydroxide/simethicone Suspension 30 milliLiter(s) Oral every 4 hours PRN Dyspepsia  gabapentin 300 milliGRAM(s) Oral four times a day PRN pain  melatonin 3 milliGRAM(s) Oral at bedtime PRN Insomnia  ondansetron   Disintegrating Tablet 4 milliGRAM(s) Oral every 8 hours PRN Nausea and/or Vomiting  ondansetron Injectable 4 milliGRAM(s) IV Push every 8 hours PRN Nausea and/or Vomiting  oxycodone    5 mG/acetaminophen 325 mG 1 Tablet(s) Oral every 4 hours PRN Moderate Pain (4 - 6)

## 2023-05-22 NOTE — DIETITIAN INITIAL EVALUATION ADULT - ADD RECOMMEND
Liberalize diet to 2 Gm Na.  Record PO intake in EMR after each meal (nursing.) MVI w/ minerals daily to ensure 100% RDA met Consider adding thiamine 100 mg daily 2/2 poor PO intake/ malnutrition Monitor bowel movements, if no BM for >3 days, consider implementing bowel regimen. Ensure plus tid. Confirm Goals of Care regarding nutrition support. Monitor PO intake, tolerance, labs and weight.

## 2023-05-22 NOTE — PROGRESS NOTE ADULT - ASSESSMENT
69 yo woman with CAD, hx of MI, chronic pain, osteoarthritis, not on medication for past several weeks as she was unable to be seen by her PCP, is now undomiciled, sent to ED from new PCP office when she was noted there to have markedly elevated blood pressures. In the ED here, BP elevated. Troponin minimally elevated. BNP 29,000. EKG without acute ischemic changes. CXR clear. CT head w/ old lacunar infarct. Admitted to Medicine.     HTN emergency  Uncontrolled HTN upon presentation due to lapse in Rx availability. Coreg and hydralazine have been resumed and blood pressure is improving.   - Continue Coreg, hydralazine  - Monitor BP and titrate medications as needed      Elevated troponin  Has known CAD. Remote hx of MI. Hx of coronary stent. Elevated troponin upon admission here likely due to myocardial demand ischemia without infarction, in setting of accelerated HTN. No angina or CHF sx. TTE done. Normal LVEF. No regional wall motion abnormalities.   - Continue aspirin, statin, Coreg    Chronic diastolic CHF  Likely due to longstanding HTN, suboptimally controlled. Markedly elevated BNP on labs. TTE with mild concentric LVH, preserved LV EF. Mildly dilated LA. Initial CXR clear.  - Continue Coreg, hydralazine, added imdur today  - Is and Os, daily wt    Moderate mitral regurgitation  As noted on TTE  - Continue to monitor as outpatient     LIONEL  Cr 1.8 on labs 48 hrs ago. No urinary complaints. No hypotensive episodes. No IV contrast. UA negative. Etiology unclear. FENa 1.98%. UPCR 1.5. Normal post void residual volume. Cr now gradually improving.   - Continue to encourage oral hydration  - Continue to trend Cr  - Is and Os  - Avoid nephrotoxic agents    HyperK  Resolved with lokelma  - Continue to monitor K    New mild hypoxia, R lower lung field infiltrate on CXR, unable to rule out pneumonia, unable to rule out Gram-negative organism, not present upon admission  No fevers or rigors. No significant chest complaints but does have this new O2 requirement and CXR findings. Repeat BNP downtrending. Less likely decompensating CHF. Started on cefepime and doxycycline this evening to cover for pneumonia.  - Continue cefepime and doxycycline day 1  - Oxygen as needed  - Encourage incentive spirometry  - Check procalcitonin and lactate    Severe protein calorie malnutrition  Appreciate dietician input  - Liberalized diet to regular  - Trend weight  - Added MVI with minerals and thiamine daily    Physical deconditioning, debility  PT eval requested  - F/u PT eval, place OOB to chair daily      Dispo: For shelter placement once clinically improved and inpatient workup is complete

## 2023-05-22 NOTE — DIETITIAN INITIAL EVALUATION ADULT - PERTINENT MEDS FT
MEDICATIONS  (STANDING):  aspirin enteric coated 81 milliGRAM(s) Oral daily  atorvastatin 10 milliGRAM(s) Oral at bedtime  carvedilol 25 milliGRAM(s) Oral every 12 hours  dextrose 5% + sodium chloride 0.45%. 1000 milliLiter(s) (200 mL/Hr) IV Continuous <Continuous>  hydrALAZINE 25 milliGRAM(s) Oral four times a day  nicotine - 21 mG/24Hr(s) Patch 1 Patch Transdermal daily  pantoprazole    Tablet 40 milliGRAM(s) Oral before breakfast  sodium chloride 0.9%. 1000 milliLiter(s) (100 mL/Hr) IV Continuous <Continuous>    MEDICATIONS  (PRN):  acetaminophen     Tablet .. 650 milliGRAM(s) Oral every 6 hours PRN Temp greater or equal to 38C (100.4F), Mild Pain (1 - 3)  ALPRAZolam 0.5 milliGRAM(s) Oral two times a day PRN Anxiety  aluminum hydroxide/magnesium hydroxide/simethicone Suspension 30 milliLiter(s) Oral every 4 hours PRN Dyspepsia  gabapentin 300 milliGRAM(s) Oral four times a day PRN pain  melatonin 3 milliGRAM(s) Oral at bedtime PRN Insomnia  ondansetron   Disintegrating Tablet 4 milliGRAM(s) Oral every 8 hours PRN Nausea and/or Vomiting  ondansetron Injectable 4 milliGRAM(s) IV Push every 8 hours PRN Nausea and/or Vomiting  oxycodone    5 mG/acetaminophen 325 mG 1 Tablet(s) Oral every 4 hours PRN Moderate Pain (4 - 6)

## 2023-05-22 NOTE — DIETITIAN INITIAL EVALUATION ADULT - PERTINENT LABORATORY DATA
05-22    145  |  115<H>  |  32<H>  ----------------------------<  91  4.9   |  24  |  1.57<H>    Ca    7.5<L>      22 May 2023 06:30  Phos  4.8     05-21  Mg     2.0     05-21    TPro  6.5  /  Alb  2.3<L>  /  TBili  0.2  /  DBili  x   /  AST  14<L>  /  ALT  17  /  AlkPhos  78  05-21

## 2023-05-23 LAB
ADD ON TEST-SPECIMEN IN LAB: SIGNIFICANT CHANGE UP
ANION GAP SERPL CALC-SCNC: 3 MMOL/L — LOW (ref 5–17)
BASOPHILS # BLD AUTO: 0.02 K/UL — SIGNIFICANT CHANGE UP (ref 0–0.2)
BASOPHILS NFR BLD AUTO: 0.3 % — SIGNIFICANT CHANGE UP (ref 0–2)
BUN SERPL-MCNC: 32 MG/DL — HIGH (ref 7–23)
CALCIUM SERPL-MCNC: 7.7 MG/DL — LOW (ref 8.5–10.1)
CHLORIDE SERPL-SCNC: 116 MMOL/L — HIGH (ref 96–108)
CO2 SERPL-SCNC: 26 MMOL/L — SIGNIFICANT CHANGE UP (ref 22–31)
CREAT SERPL-MCNC: 1.62 MG/DL — HIGH (ref 0.5–1.3)
EGFR: 34 ML/MIN/1.73M2 — LOW
EOSINOPHIL # BLD AUTO: 0.26 K/UL — SIGNIFICANT CHANGE UP (ref 0–0.5)
EOSINOPHIL NFR BLD AUTO: 4 % — SIGNIFICANT CHANGE UP (ref 0–6)
FERRITIN SERPL-MCNC: 48 NG/ML — SIGNIFICANT CHANGE UP (ref 15–150)
GLUCOSE SERPL-MCNC: 98 MG/DL — SIGNIFICANT CHANGE UP (ref 70–99)
HCT VFR BLD CALC: 27 % — LOW (ref 34.5–45)
HGB BLD-MCNC: 7.9 G/DL — LOW (ref 11.5–15.5)
IMM GRANULOCYTES NFR BLD AUTO: 0.3 % — SIGNIFICANT CHANGE UP (ref 0–0.9)
LACTATE SERPL-SCNC: 1 MMOL/L — SIGNIFICANT CHANGE UP (ref 0.7–2)
LYMPHOCYTES # BLD AUTO: 1.02 K/UL — SIGNIFICANT CHANGE UP (ref 1–3.3)
LYMPHOCYTES # BLD AUTO: 15.6 % — SIGNIFICANT CHANGE UP (ref 13–44)
MCHC RBC-ENTMCNC: 27.1 PG — SIGNIFICANT CHANGE UP (ref 27–34)
MCHC RBC-ENTMCNC: 29.3 GM/DL — LOW (ref 32–36)
MCV RBC AUTO: 92.5 FL — SIGNIFICANT CHANGE UP (ref 80–100)
MONOCYTES # BLD AUTO: 0.73 K/UL — SIGNIFICANT CHANGE UP (ref 0–0.9)
MONOCYTES NFR BLD AUTO: 11.1 % — SIGNIFICANT CHANGE UP (ref 2–14)
NEUTROPHILS # BLD AUTO: 4.5 K/UL — SIGNIFICANT CHANGE UP (ref 1.8–7.4)
NEUTROPHILS NFR BLD AUTO: 68.7 % — SIGNIFICANT CHANGE UP (ref 43–77)
PLATELET # BLD AUTO: 386 K/UL — SIGNIFICANT CHANGE UP (ref 150–400)
POTASSIUM SERPL-MCNC: 4.9 MMOL/L — SIGNIFICANT CHANGE UP (ref 3.5–5.3)
POTASSIUM SERPL-SCNC: 4.9 MMOL/L — SIGNIFICANT CHANGE UP (ref 3.5–5.3)
PROCALCITONIN SERPL-MCNC: 0.43 NG/ML — HIGH (ref 0.02–0.1)
RBC # BLD: 2.92 M/UL — LOW (ref 3.8–5.2)
RBC # FLD: 16.2 % — HIGH (ref 10.3–14.5)
SODIUM SERPL-SCNC: 145 MMOL/L — SIGNIFICANT CHANGE UP (ref 135–145)
TRANSFERRIN SERPL-MCNC: 298 MG/DL — SIGNIFICANT CHANGE UP (ref 200–360)
WBC # BLD: 6.55 K/UL — SIGNIFICANT CHANGE UP (ref 3.8–10.5)
WBC # FLD AUTO: 6.55 K/UL — SIGNIFICANT CHANGE UP (ref 3.8–10.5)

## 2023-05-23 PROCEDURE — 99233 SBSQ HOSP IP/OBS HIGH 50: CPT

## 2023-05-23 PROCEDURE — 99232 SBSQ HOSP IP/OBS MODERATE 35: CPT

## 2023-05-23 RX ADMIN — Medication 25 MILLIGRAM(S): at 20:41

## 2023-05-23 RX ADMIN — CARVEDILOL PHOSPHATE 25 MILLIGRAM(S): 80 CAPSULE, EXTENDED RELEASE ORAL at 09:03

## 2023-05-23 RX ADMIN — Medication 1 PATCH: at 06:56

## 2023-05-23 RX ADMIN — Medication 1 PATCH: at 09:04

## 2023-05-23 RX ADMIN — Medication 100 MILLIGRAM(S): at 09:04

## 2023-05-23 RX ADMIN — Medication 100 MILLIGRAM(S): at 20:40

## 2023-05-23 RX ADMIN — Medication 25 MILLIGRAM(S): at 13:11

## 2023-05-23 RX ADMIN — CEFEPIME 100 MILLIGRAM(S): 1 INJECTION, POWDER, FOR SOLUTION INTRAMUSCULAR; INTRAVENOUS at 23:05

## 2023-05-23 RX ADMIN — CARVEDILOL PHOSPHATE 25 MILLIGRAM(S): 80 CAPSULE, EXTENDED RELEASE ORAL at 20:41

## 2023-05-23 RX ADMIN — Medication 25 MILLIGRAM(S): at 23:05

## 2023-05-23 RX ADMIN — Medication 25 MILLIGRAM(S): at 18:18

## 2023-05-23 RX ADMIN — ISOSORBIDE MONONITRATE 30 MILLIGRAM(S): 60 TABLET, EXTENDED RELEASE ORAL at 09:03

## 2023-05-23 RX ADMIN — Medication 100 MILLIGRAM(S): at 09:03

## 2023-05-23 RX ADMIN — CEFEPIME 100 MILLIGRAM(S): 1 INJECTION, POWDER, FOR SOLUTION INTRAMUSCULAR; INTRAVENOUS at 09:04

## 2023-05-23 RX ADMIN — Medication 81 MILLIGRAM(S): at 09:03

## 2023-05-23 RX ADMIN — Medication 0.5 MILLIGRAM(S): at 23:05

## 2023-05-23 RX ADMIN — Medication 1 TABLET(S): at 09:03

## 2023-05-23 RX ADMIN — ATORVASTATIN CALCIUM 10 MILLIGRAM(S): 80 TABLET, FILM COATED ORAL at 20:41

## 2023-05-23 RX ADMIN — Medication 0.5 MILLIGRAM(S): at 09:03

## 2023-05-23 RX ADMIN — Medication 25 MILLIGRAM(S): at 05:37

## 2023-05-23 RX ADMIN — PANTOPRAZOLE SODIUM 40 MILLIGRAM(S): 20 TABLET, DELAYED RELEASE ORAL at 05:37

## 2023-05-23 RX ADMIN — Medication 25 MILLIGRAM(S): at 09:10

## 2023-05-23 NOTE — PHYSICAL THERAPY INITIAL EVALUATION ADULT - GENERAL OBSERVATIONS, REHAB EVAL
Pt seen for 30min PT bedside Eval. Pt rec'd semi supine in bed in NAD, +tele, declining OOB willing to participate in bedisde eval. History taken, pt ROM WFL, and strength grossly 4/5 throughout. Pt left semi supine in bed in NAD, all needs met, +bed alarm, RN aware.

## 2023-05-23 NOTE — PROGRESS NOTE ADULT - ASSESSMENT
69 yo woman with CAD, hx of MI, chronic pain, osteoarthritis, not on medication for past several weeks as she was unable to be seen by her PCP, is now undomiciled, sent to ED from new PCP office when she was noted there to have markedly elevated blood pressures. In the ED here, BP elevated. Troponin minimally elevated. BNP 29,000. EKG without acute ischemic changes. CXR clear. CT head w/ old lacunar infarct. Admitted to Medicine.     Hypertensive emergency  Uncontrolled HTN upon presentation due to lapse in Rx availability. Carvedilol and hydralazine were resumed and while blood pressure improved, it remained suboptimal. Imdur was added thereafter. BP now WNL.   - Continue carvedilol, hydralazine, Imdur    Elevated troponin  Has known CAD. Remote hx of MI. Hx of coronary stent. Elevated troponin upon admission here likely due to myocardial demand ischemia without infarction, in setting of accelerated HTN. No angina or CHF sx. TTE done. Normal LVEF. No regional wall motion abnormalities.   - Continue aspirin, statin, carvedilol, Imdur    Chronic diastolic CHF  Likely due to longstanding HTN, suboptimally controlled. Markedly elevated BNP on labs. TTE with mild concentric LVH, preserved LV EF. Mildly dilated LA. Initial CXR clear.  - Continue carvedilol, hydralazine, Imdur  - Is and Os, daily wt    Moderate mitral regurgitation  As noted on TTE  - Continue to monitor as outpatient     LIONEL  Cr 1.8 on labs 72hrs ago. No urinary complaints. No hypotensive episodes. No IV contrast. UA negative. Etiology unclear. FENa 1.98%. UPCR 1.5. Normal post void residual volume. Cr now seems to have reached plateau around 1.6.   - Continue to encourage oral hydration  - Continue to trend Cr  - Is and Os  - Avoid nephrotoxic agents    HyperK  Resolved with Lokelma  - Continue to monitor K    New mild hypoxia with R lower lung field infiltrate on CXR, unable to rule out pneumonia, unable to rule out Gram-negative organism, not present upon admission  No fevers or rigors. No significant chest complaints but did have new malaise, new O2 requirement and CXR findings. Repeat BNP downtrending. Less likely decompensating CHF. Started on cefepime and doxycycline yesterday PM to cover for pneumonia. Appears somewhat improved today. Weaned to room air. Lactate WNL.   - Continue cefepime and doxycycline day 2  - Ambulatory O2 evaluation  - Encourage incentive spirometry    Severe protein calorie malnutrition  Appreciate dietician input  - Liberalized diet to regular  - Trend weight  - Added MVI with minerals and thiamine daily    Physical deconditioning, debility  PT eval requested  - F/u PT eval, place OOB to chair daily      Dispo: For shelter placement once clinically improved and inpatient workup is complete, possibly in 24 hrs   68 year-old woman with CAD, hx of MI, chronic pain, osteoarthritis, not on medication for past several weeks as she was unable to be seen by her PCP, was residing at a local shelter, sent to ED from new PCP office when she was noted there to have markedly elevated blood pressures. In the ED here, BP elevated. Troponin minimally elevated. BNP 29,000. EKG without acute ischemic changes. CXR clear. CT head w/ old lacunar infarct. Admitted to Medicine.     Hypertensive emergency  Uncontrolled HTN upon presentation due to lapse in Rx availability. Carvedilol and hydralazine were resumed and while blood pressure improved, it remained suboptimal. Imdur was added thereafter. BP now WNL.   - Continue carvedilol, hydralazine, Imdur    Elevated troponin  Has known CAD. Remote hx of MI. Hx of coronary stent. Elevated troponin upon admission here likely due to myocardial demand ischemia without infarction, in setting of accelerated HTN. No angina or CHF sx. TTE done. Normal LVEF. No regional wall motion abnormalities.   - Continue aspirin, statin, carvedilol, Imdur    Chronic diastolic CHF  Likely due to longstanding HTN, suboptimally controlled. Markedly elevated BNP on labs. TTE with mild concentric LVH, preserved LV EF. Mildly dilated LA. Initial CXR clear.  - Continue carvedilol, hydralazine, Imdur  - Is and Os, daily wt    Moderate mitral regurgitation  As noted on TTE  - Continue to monitor as outpatient     LIONEL  Cr 1.8 on labs 72hrs ago. No urinary complaints. No hypotensive episodes. No IV contrast. UA negative. Etiology unclear. FENa 1.98%. UPCR 1.5. Normal post void residual volume. Cr now seems to have reached plateau around 1.6.   - Continue to encourage oral hydration  - Continue to trend Cr  - Is and Os  - Avoid nephrotoxic agents    HyperK  Resolved with Lokelma  - Continue to monitor K    Unable to rule out pneumonia, unable to rule out Gram-negative organism, not present upon admission  New mild hypoxia as of 5/22 with R lower lung field infiltrate on CXR. No fevers or rigors. No significant chest complaints but did have new malaise. Repeat BNP downtrending. Less likely decompensating CHF. Started on cefepime and doxycycline yesterday PM to cover for pneumonia. Appears somewhat improved today. Weaned to room air. Lactate WNL.   - Continue cefepime and doxycycline day 2  - Ambulatory O2 evaluation  - Encourage incentive spirometry    Normocytic anemia, chronicity unclear  Hgb 9.7 on admission. No overt bleeding. No sign of hemolysis. Iron 25  TIBC 377  Iron Sat 7%  Ferritin 48.  Could be mixed anemia of chronic disease and iron deficiency.   - Requested fecal occult blood testing  - Started iron with vit C  - Outpatient follow up for further workup    Severe protein calorie malnutrition  Appreciate dietician input  - Liberalized diet to regular  - Trend weight  - Added MVI with minerals and thiamine daily    Physical deconditioning, debility  PT eval requested  - F/u PT eval, place OOB to chair daily      Dispo: For shelter placement once clinically improved and inpatient workup is complete, possibly in 24 hrs   68 year-old woman with CAD, hx of MI, chronic pain, osteoarthritis, not on medication for past several weeks as she was unable to be seen by her PCP, was residing at a local shelter, sent to ED from new PCP office when she was noted there to have markedly elevated blood pressures. In the ED here, BP elevated. Troponin minimally elevated. BNP 29,000. EKG without acute ischemic changes. CXR clear. CT head w/ old lacunar infarct. Admitted to Medicine.     Hypertensive emergency  Uncontrolled HTN upon presentation due to lapse in Rx availability. Carvedilol and hydralazine were resumed and while blood pressure improved, it remained suboptimal. Imdur was added thereafter. BP now WNL.   - Continue carvedilol, hydralazine, Imdur    Elevated troponin  Has known CAD. Remote hx of MI. Hx of coronary stent. Elevated troponin upon admission here likely due to myocardial demand ischemia without infarction, in setting of accelerated HTN. No angina or CHF sx. TTE done. Normal LVEF. No regional wall motion abnormalities.   - Continue aspirin, statin, carvedilol, Imdur    Chronic diastolic CHF  Likely due to longstanding HTN, suboptimally controlled. Markedly elevated BNP on labs. TTE with mild concentric LVH, preserved LV EF. Mildly dilated LA. Initial CXR clear.  - Continue carvedilol, hydralazine, Imdur  - Is and Os, daily wt    Moderate mitral regurgitation  As noted on TTE  - Continue to monitor as outpatient     LIONEL  Cr 1.8 on labs 72hrs ago. No urinary complaints. No hypotensive episodes. No IV contrast. UA negative. Etiology unclear. FENa 1.98%. UPCR 1.5. Normal post void residual volume. Cr now seems to have reached plateau around 1.6.   - Continue to encourage oral hydration  - Continue to trend Cr  - Is and Os  - Avoid nephrotoxic agents    HyperK  Resolved with Lokelma  - Continue to monitor K    Unable to rule out pneumonia, unable to rule out Gram-negative organism, not present upon admission  New mild hypoxia as of 5/22 with R lower lung field infiltrate on CXR. No fevers or rigors. No significant chest complaints but did have new malaise. Repeat BNP downtrending. Less likely decompensating CHF. Started on cefepime and doxycycline yesterday PM to cover for pneumonia. Appears somewhat improved today. Weaned to room air. Lactate WNL.   - Continue cefepime and doxycycline day 2  - Ambulatory O2 evaluation  - Encourage incentive spirometry    Normocytic anemia, chronicity unclear  Hgb 9.7 on admission. No overt bleeding. No sign of hemolysis. Iron 25  TIBC 377  Iron Sat 7%  Ferritin 48.  Could be mixed anemia of chronic disease and iron deficiency.   - Requested fecal occult blood testing  - Started iron with vit C  - Outpatient follow up for further workup    Severe protein calorie malnutrition  Appreciate dietician input  - Liberalized diet to regular  - Trend weight  - Added MVI with minerals and thiamine daily    Physical deconditioning, debility  PT eval requested  - F/u PT eval, place OOB to chair daily      DVT px: LMWH  Code status: Full  Dispo: For shelter placement once clinically improved and inpatient workup is complete, possibly in 24 hrs

## 2023-05-23 NOTE — PROGRESS NOTE ADULT - SUBJECTIVE AND OBJECTIVE BOX
Chief Complaint: Sent to ED for elevated blood pressure    Interval Hx: Yesterday, when patient was noted to have new O2 requirement of 2L/min and increased malaise, CXR was obtained and it revealed a new R base infiltrate for which she was started on cefepime and doxycycline. She is clinically unchanged today. Afebrile. No rigors. No chest pain or tightness. No dyspnea at rest. No cough. She does have malaise, debility. No other complaints. Weaned to room air this afternoon. Awaiting PT re-evaluation, ambulatory O2 evaluation and if doing well with no need for O2, then shelter placement.     ROS: Multi system review is comprehensively negative x 10 systems    Vitals:   T(F): 97.7 (23 May 2023 08:18), Max: 97.9 (22 May 2023 20:33)  HR: 66 (23 May 2023 13:19) (66 - 71)  BP: 116/47 (23 May 2023 18:30) (116/47 - 169/66)  RR: 18 (23 May 2023 13:19) (18 - 18)  SpO2: 94% (23 May 2023 13:19) (94% - 98%) on room air    Exam:  Gen: Comfortable  HEENT: NCAT PERRL EOMI MMM clear oropharynx  Neck: Supple, no JVD, no LAD, no thyromegaly  CVS: s1 s2 normal, RRR  Chest: Normal resp effort, lungs CTA B/L  Abd: +BS, soft NT ND   Ext: No edema or calf tenderness  Skin: Warm, dry  Mood: Calm, pleasant  Neuro: Awake and alert, answers questions appropriately, follows commands, no gross deficits    Labs:                        7.9    6.55  )--------( 386             27.0       Iron 25  TIBC 377  Iron Sat 7%  Ferritin 48      145  |  116  |  32  -----------------------<  98  4.9   |   26   |  1.62    Ca 7.5 (WNL when corrected for albumin)  Mg 1.8    TPro  8.2  /  Alb  2.9  /  TBili  0.3  /  DBili  x   /  AST  19  /  ALT  25  /  AlkPhos  111      Troponin 61, 44   proBNP 29,521 --> 10,631    Procalcitonin 0.43  Lactate 1.0     FENa 1.98%   UPCR 1.5    Urinalysis 5/21: Yellow, clear, ket neg, prot 15, N neg, LE neg, RBC 3-5, WBC 0-2, bact occ  Urinalysis 5/18: Yellow, clear, ket neg, prot 30, N neg, LE neg, RBC 0-2, WBC 0-2, bact few    Imaging:  CXR 5/22: Heart size normal. Left shoulder replacement again noted. Present film shows infiltrate at right base laterally new since May 18.    CT head 5/18: Parenchymal volume loss and chronic microvascular ischemic changes are identified Old lacunar infarct involving the right basal ganglia region is identified. Evaluation of the osseous appropriate window appears unremarkable The visualized paranasal sinuses appear clear Opacification of the right mastoid region is seen which compatible with underlying inflammatory change.    CXR 5/18: The visualized lungs are clear of airspace consolidations or pleural effusions. No pneumothorax. The heart and mediastinum size and configuration are within normal limits. Visualized osseous thorax intact.    Cardiac Testing:  Tele 5/19-20: NSR, rate 80s-90s    TTE 5/19: Mild concentric left ventricular hypertrophy is present. Estimated left ventricular ejection fraction is 55-60 %. The left atrium is mildly dilated. Mild aortic sclerosis is present with normal valvular opening. Moderate (2+) mitral regurgitation is present. EA reversal of the mitral inflow consistent with reduced compliance of the left ventricle.    EKG 5/18: Rate 97. SR. LVH.     Meds:  MEDICATIONS  (STANDING):  aspirin enteric coated 81 milliGRAM(s) Oral daily  atorvastatin 10 milliGRAM(s) Oral at bedtime  carvedilol 25 milliGRAM(s) Oral every 12 hours  cefepime   IVPB 2000 milliGRAM(s) IV Intermittent every 12 hours  doxycycline monohydrate Capsule 100 milliGRAM(s) Oral every 12 hours  hydrALAZINE 25 milliGRAM(s) Oral four times a day  isosorbide   mononitrate ER Tablet (IMDUR) 30 milliGRAM(s) Oral daily  multivitamin/minerals 1 Tablet(s) Oral daily  nicotine - 21 mG/24Hr(s) Patch 1 Patch Transdermal daily  pantoprazole    Tablet 40 milliGRAM(s) Oral before breakfast  thiamine 100 milliGRAM(s) Oral daily    MEDICATIONS  (PRN):  acetaminophen     Tablet .. 650 milliGRAM(s) Oral every 6 hours PRN Temp greater or equal to 38C (100.4F), Mild Pain (1 - 3)  ALPRAZolam 0.5 milliGRAM(s) Oral two times a day PRN Anxiety  aluminum hydroxide/magnesium hydroxide/simethicone Suspension 30 milliLiter(s) Oral every 4 hours PRN Dyspepsia  diphenhydrAMINE 25 milliGRAM(s) Oral every 6 hours PRN Rash and/or Itching  gabapentin 300 milliGRAM(s) Oral four times a day PRN pain  melatonin 3 milliGRAM(s) Oral at bedtime PRN Insomnia  ondansetron   Disintegrating Tablet 4 milliGRAM(s) Oral every 8 hours PRN Nausea and/or Vomiting  ondansetron Injectable 4 milliGRAM(s) IV Push every 8 hours PRN Nausea and/or Vomiting  oxycodone    5 mG/acetaminophen 325 mG 1 Tablet(s) Oral every 4 hours PRN Moderate Pain (4 - 6)   Chief Complaint: Sent to ED for elevated blood pressure    Interval Hx: Yesterday, when patient was noted to have new O2 requirement of 2L/min and increased malaise, CXR was obtained and it revealed a new R base infiltrate for which she was started on cefepime and doxycycline. She is clinically unchanged today. Afebrile. No rigors. No chest pain or tightness. No dyspnea at rest. No cough. She does have malaise, debility. No other complaints. Weaned to room air this afternoon. Awaiting PT re-evaluation, ambulatory O2 evaluation and if doing well with no need for O2, then shelter placement.     ROS: Multi system review is comprehensively negative x 10 systems    Vitals:   T(F): 97.7 (23 May 2023 08:18), Max: 97.9 (22 May 2023 20:33)  HR: 66 (23 May 2023 13:19) (66 - 71)  BP: 116/47 (23 May 2023 18:30) (116/47 - 169/66)  RR: 18 (23 May 2023 13:19) (18 - 18)  SpO2: 94% (23 May 2023 13:19) (94% - 98%) on room air    Exam:  Gen: Comfortable  HEENT: NCAT PERRL EOMI MMM clear oropharynx  Neck: Supple, no JVD, no LAD, no thyromegaly  CVS: s1 s2 normal, RRR  Chest: Normal resp effort, lungs CTA B/L  Abd: +BS, soft NT ND   Ext: No edema or calf tenderness  Skin: Warm, dry  Mood: Calm, pleasant  Neuro: Awake and alert, answers questions appropriately, follows commands, no gross deficits    Labs:                        7.9    6.55  )--------( 386             27.0       Iron 25  TIBC 377  Iron Sat 7%  Ferritin 48      145  |  116  |  32  -----------------------<  98  4.9   |   26   |  1.62    Ca 7.5 (WNL when corrected for albumin)  Mg 1.8    TPro  8.2  /  Alb  2.9  /  TBili  0.3  /  DBili  x   /  AST  19  /  ALT  25  /  AlkPhos  111      Troponin 61, 44   proBNP 29,521 --> 10,631    Procalcitonin 0.43  Lactate 1.0     FENa 1.98%   UPCR 1.5    Urinalysis 5/21: Yellow, clear, ket neg, prot 15, N neg, LE neg, RBC 3-5, WBC 0-2, bact occ  Urinalysis 5/18: Yellow, clear, ket neg, prot 30, N neg, LE neg, RBC 0-2, WBC 0-2, bact few    Imaging:  CXR 5/22: Heart size normal. Left shoulder replacement again noted. Present film shows infiltrate at right base laterally new since May 18.    CT head 5/18: Parenchymal volume loss and chronic microvascular ischemic changes are identified Old lacunar infarct involving the right basal ganglia region is identified. Evaluation of the osseous appropriate window appears unremarkable The visualized paranasal sinuses appear clear Opacification of the right mastoid region is seen which compatible with underlying inflammatory change.    CXR 5/18: The visualized lungs are clear of airspace consolidations or pleural effusions. No pneumothorax. The heart and mediastinum size and configuration are within normal limits. Visualized osseous thorax intact.    Cardiac Testing:  Tele 5/19-20: NSR, rate 80s-90s    TTE 5/19: Mild concentric left ventricular hypertrophy is present. Estimated left ventricular ejection fraction is 55-60 %. The left atrium is mildly dilated. Mild aortic sclerosis is present with normal valvular opening. Moderate (2+) mitral regurgitation is present. EA reversal of the mitral inflow consistent with reduced compliance of the left ventricle.    EKG 5/18: Rate 97. SR. LVH.     Meds:  MEDICATIONS  (STANDING):  ascorbic acid 500 milliGRAM(s) Oral daily  aspirin enteric coated 81 milliGRAM(s) Oral daily  atorvastatin 10 milliGRAM(s) Oral at bedtime  carvedilol 25 milliGRAM(s) Oral every 12 hours  cefepime   IVPB 2000 milliGRAM(s) IV Intermittent every 12 hours  doxycycline monohydrate Capsule 100 milliGRAM(s) Oral every 12 hours  enoxaparin Injectable 30 milliGRAM(s) SubCutaneous every 24 hours  ferrous    sulfate 325 milliGRAM(s) Oral daily  hydrALAZINE 25 milliGRAM(s) Oral four times a day  isosorbide   mononitrate ER Tablet (IMDUR) 30 milliGRAM(s) Oral daily  multivitamin/minerals 1 Tablet(s) Oral daily  nicotine - 21 mG/24Hr(s) Patch 1 Patch Transdermal daily  pantoprazole    Tablet 40 milliGRAM(s) Oral before breakfast  thiamine 100 milliGRAM(s) Oral daily    MEDICATIONS  (PRN):  acetaminophen     Tablet .. 650 milliGRAM(s) Oral every 6 hours PRN Temp greater or equal to 38C (100.4F), Mild Pain (1 - 3)  ALPRAZolam 0.5 milliGRAM(s) Oral two times a day PRN Anxiety  aluminum hydroxide/magnesium hydroxide/simethicone Suspension 30 milliLiter(s) Oral every 4 hours PRN Dyspepsia  diphenhydrAMINE 25 milliGRAM(s) Oral every 6 hours PRN Rash and/or Itching  gabapentin 300 milliGRAM(s) Oral four times a day PRN pain  melatonin 3 milliGRAM(s) Oral at bedtime PRN Insomnia  ondansetron   Disintegrating Tablet 4 milliGRAM(s) Oral every 8 hours PRN Nausea and/or Vomiting  ondansetron Injectable 4 milliGRAM(s) IV Push every 8 hours PRN Nausea and/or Vomiting  oxycodone    5 mG/acetaminophen 325 mG 1 Tablet(s) Oral every 4 hours PRN Moderate Pain (4 - 6)

## 2023-05-23 NOTE — PHYSICAL THERAPY INITIAL EVALUATION ADULT - PERTINENT HX OF CURRENT PROBLEM, REHAB EVAL
67 yo woman with CAD, hx of MI, chronic pain, osteoarthritis, not on medication for past several weeks as she was unable to be seen by her PCP, is now undomiciled, sent to ED from new PCP office when she was noted there to have markedly elevated blood pressures.    CT HEAD: Old lacunar infarct as described above.

## 2023-05-24 LAB
24R-OH-CALCIDIOL SERPL-MCNC: 12.2 NG/ML — LOW (ref 30–80)
ANION GAP SERPL CALC-SCNC: 3 MMOL/L — LOW (ref 5–17)
BUN SERPL-MCNC: 34 MG/DL — HIGH (ref 7–23)
CALCIUM SERPL-MCNC: 8.1 MG/DL — LOW (ref 8.5–10.1)
CHLORIDE SERPL-SCNC: 113 MMOL/L — HIGH (ref 96–108)
CO2 SERPL-SCNC: 26 MMOL/L — SIGNIFICANT CHANGE UP (ref 22–31)
CREAT ?TM UR-MCNC: 64 MG/DL — SIGNIFICANT CHANGE UP
CREAT SERPL-MCNC: 1.96 MG/DL — HIGH (ref 0.5–1.3)
EGFR: 27 ML/MIN/1.73M2 — LOW
GLUCOSE SERPL-MCNC: 97 MG/DL — SIGNIFICANT CHANGE UP (ref 70–99)
HCT VFR BLD CALC: 26.5 % — LOW (ref 34.5–45)
HGB BLD-MCNC: 8 G/DL — LOW (ref 11.5–15.5)
MCHC RBC-ENTMCNC: 27.5 PG — SIGNIFICANT CHANGE UP (ref 27–34)
MCHC RBC-ENTMCNC: 30.2 GM/DL — LOW (ref 32–36)
MCV RBC AUTO: 91.1 FL — SIGNIFICANT CHANGE UP (ref 80–100)
PLATELET # BLD AUTO: 374 K/UL — SIGNIFICANT CHANGE UP (ref 150–400)
POTASSIUM SERPL-MCNC: 5.1 MMOL/L — SIGNIFICANT CHANGE UP (ref 3.5–5.3)
POTASSIUM SERPL-SCNC: 5.1 MMOL/L — SIGNIFICANT CHANGE UP (ref 3.5–5.3)
PROT ?TM UR-MCNC: 94 MG/DL — HIGH (ref 0–12)
PROT/CREAT UR-RTO: 1.5 RATIO — HIGH (ref 0–0.2)
RBC # BLD: 2.91 M/UL — LOW (ref 3.8–5.2)
RBC # FLD: 16.1 % — HIGH (ref 10.3–14.5)
SODIUM SERPL-SCNC: 142 MMOL/L — SIGNIFICANT CHANGE UP (ref 135–145)
SODIUM UR-SCNC: 90 MMOL/L — SIGNIFICANT CHANGE UP
VIT B12 SERPL-MCNC: 336 PG/ML — SIGNIFICANT CHANGE UP (ref 232–1245)
WBC # BLD: 7.19 K/UL — SIGNIFICANT CHANGE UP (ref 3.8–10.5)
WBC # FLD AUTO: 7.19 K/UL — SIGNIFICANT CHANGE UP (ref 3.8–10.5)

## 2023-05-24 PROCEDURE — 99232 SBSQ HOSP IP/OBS MODERATE 35: CPT

## 2023-05-24 RX ORDER — CEFTRIAXONE 500 MG/1
1000 INJECTION, POWDER, FOR SOLUTION INTRAMUSCULAR; INTRAVENOUS EVERY 24 HOURS
Refills: 0 | Status: DISCONTINUED | OUTPATIENT
Start: 2023-05-24 | End: 2023-05-25

## 2023-05-24 RX ORDER — CEFTRIAXONE 500 MG/1
1000 INJECTION, POWDER, FOR SOLUTION INTRAMUSCULAR; INTRAVENOUS EVERY 24 HOURS
Refills: 0 | Status: DISCONTINUED | OUTPATIENT
Start: 2023-05-24 | End: 2023-05-24

## 2023-05-24 RX ORDER — FERROUS SULFATE 325(65) MG
325 TABLET ORAL DAILY
Refills: 0 | Status: DISCONTINUED | OUTPATIENT
Start: 2023-05-24 | End: 2023-05-25

## 2023-05-24 RX ORDER — ASCORBIC ACID 60 MG
500 TABLET,CHEWABLE ORAL DAILY
Refills: 0 | Status: DISCONTINUED | OUTPATIENT
Start: 2023-05-24 | End: 2023-05-25

## 2023-05-24 RX ORDER — CHOLECALCIFEROL (VITAMIN D3) 125 MCG
1000 CAPSULE ORAL DAILY
Refills: 0 | Status: DISCONTINUED | OUTPATIENT
Start: 2023-05-24 | End: 2023-05-25

## 2023-05-24 RX ORDER — ENOXAPARIN SODIUM 100 MG/ML
30 INJECTION SUBCUTANEOUS EVERY 24 HOURS
Refills: 0 | Status: DISCONTINUED | OUTPATIENT
Start: 2023-05-24 | End: 2023-05-25

## 2023-05-24 RX ADMIN — Medication 1 PATCH: at 11:06

## 2023-05-24 RX ADMIN — ONDANSETRON 4 MILLIGRAM(S): 8 TABLET, FILM COATED ORAL at 04:49

## 2023-05-24 RX ADMIN — ONDANSETRON 4 MILLIGRAM(S): 8 TABLET, FILM COATED ORAL at 20:12

## 2023-05-24 RX ADMIN — Medication 100 MILLIGRAM(S): at 09:50

## 2023-05-24 RX ADMIN — CARVEDILOL PHOSPHATE 25 MILLIGRAM(S): 80 CAPSULE, EXTENDED RELEASE ORAL at 09:49

## 2023-05-24 RX ADMIN — Medication 25 MILLIGRAM(S): at 05:03

## 2023-05-24 RX ADMIN — CEFTRIAXONE 1000 MILLIGRAM(S): 500 INJECTION, POWDER, FOR SOLUTION INTRAMUSCULAR; INTRAVENOUS at 12:04

## 2023-05-24 RX ADMIN — Medication 25 MILLIGRAM(S): at 16:54

## 2023-05-24 RX ADMIN — CEFEPIME 100 MILLIGRAM(S): 1 INJECTION, POWDER, FOR SOLUTION INTRAMUSCULAR; INTRAVENOUS at 09:49

## 2023-05-24 RX ADMIN — CARVEDILOL PHOSPHATE 25 MILLIGRAM(S): 80 CAPSULE, EXTENDED RELEASE ORAL at 21:16

## 2023-05-24 RX ADMIN — PANTOPRAZOLE SODIUM 40 MILLIGRAM(S): 20 TABLET, DELAYED RELEASE ORAL at 06:30

## 2023-05-24 RX ADMIN — ISOSORBIDE MONONITRATE 30 MILLIGRAM(S): 60 TABLET, EXTENDED RELEASE ORAL at 09:49

## 2023-05-24 RX ADMIN — GABAPENTIN 300 MILLIGRAM(S): 400 CAPSULE ORAL at 04:43

## 2023-05-24 RX ADMIN — Medication 1 PATCH: at 09:50

## 2023-05-24 RX ADMIN — ENOXAPARIN SODIUM 30 MILLIGRAM(S): 100 INJECTION SUBCUTANEOUS at 09:50

## 2023-05-24 RX ADMIN — Medication 325 MILLIGRAM(S): at 09:49

## 2023-05-24 RX ADMIN — Medication 81 MILLIGRAM(S): at 09:50

## 2023-05-24 RX ADMIN — GABAPENTIN 300 MILLIGRAM(S): 400 CAPSULE ORAL at 16:56

## 2023-05-24 RX ADMIN — Medication 500 MILLIGRAM(S): at 09:50

## 2023-05-24 RX ADMIN — Medication 100 MILLIGRAM(S): at 21:16

## 2023-05-24 RX ADMIN — ATORVASTATIN CALCIUM 10 MILLIGRAM(S): 80 TABLET, FILM COATED ORAL at 21:16

## 2023-05-24 RX ADMIN — Medication 3 MILLIGRAM(S): at 21:16

## 2023-05-24 RX ADMIN — Medication 1 TABLET(S): at 09:50

## 2023-05-24 RX ADMIN — Medication 25 MILLIGRAM(S): at 12:37

## 2023-05-24 NOTE — PROGRESS NOTE ADULT - ASSESSMENT
68 year-old woman with CAD, hx of MI, chronic pain, osteoarthritis, not on medication for past several weeks as she was unable to be seen by her PCP, was residing at a local shelter, sent to ED from new PCP office when she was noted there to have markedly elevated blood pressures. In the ED here, BP elevated. Troponin minimally elevated. BNP 29,000. EKG without acute ischemic changes. CXR clear. CT head w/ old lacunar infarct. Admitted to Medicine.     #Hypertensive emergency  Uncontrolled HTN upon presentation due to lapse in Rx availability. Carvedilol and hydralazine were resumed and while blood pressure improved, it remained suboptimal. Imdur was added thereafter. BP now WNL.   - Continue carvedilol, hydralazine, Imdur    #Elevated troponin  Has known CAD. Remote hx of MI. Hx of coronary stent. Elevated troponin upon admission here likely due to myocardial demand ischemia without infarction, in setting of accelerated HTN. No angina or CHF sx. TTE done. Normal LVEF. No regional wall motion abnormalities.   - Continue aspirin, statin, carvedilol, Imdur    #Chronic diastolic CHF  Likely due to longstanding HTN, suboptimally controlled. Markedly elevated BNP on labs. TTE with mild concentric LVH, preserved LV EF. Mildly dilated LA. Initial CXR clear.  - Continue carvedilol, hydralazine, Imdur  - Is and Os, daily wt    #Moderate mitral regurgitation  As noted on TTE  - Continue to monitor as outpatient     #LIONEL  Cr 1.8 on labs 72hrs ago. No urinary complaints. No hypotensive episodes. No IV contrast. UA negative. Etiology unclear. FENa 1.98%. UPCR 1.5. Normal post void residual volume. Cr now seems to have reached plateau around 1.6.   - Continue to encourage oral hydration  - Continue to trend Cr  - Is and Os  - Avoid nephrotoxic agents    #HyperK  Resolved with Lokelma  - Continue to monitor K    #Unable to rule out pneumonia, unable to rule out Gram-negative organism, not present upon admission  New mild hypoxia as of 5/22 with R lower lung field infiltrate on CXR. No fevers or rigors. No significant chest complaints but did have new malaise. Repeat BNP downtrending. Less likely decompensating CHF.   -on RA including w ambulation  -Doxy, Cefepime changed to CTX  - Encourage incentive spirometry    #Normocytic anemia, chronicity unclear  Hgb 9.7 on admission. No overt bleeding. No sign of hemolysis. Iron 25  TIBC 377  Iron Sat 7%  Ferritin 48.  Could be mixed anemia of chronic disease and iron deficiency.   - Requested fecal occult blood testing  - Started iron with vit C  - Outpatient follow up for further workup    #Severe protein calorie malnutrition  Appreciate dietician input  - Liberalized diet to regular  - Trend weight  - Added MVI with minerals and thiamine daily    #Physical deconditioning, debility  -PT eval    DVT px: LMWH  Code status: Full  Dispo: For shelter placement, likely 5/25

## 2023-05-24 NOTE — PROGRESS NOTE ADULT - SUBJECTIVE AND OBJECTIVE BOX
HOSPITALIST ATTENDING PROGRESS NOTE    Chart and meds reviewed.  Patient seen and examined.    CC: uncontrolled BP    Subjective: Reports breathing well. Denies CP, cough.    All other systems reviewed and found to be negative with the exception of what has been described above.    MEDICATIONS  (STANDING):  ascorbic acid 500 milliGRAM(s) Oral daily  aspirin enteric coated 81 milliGRAM(s) Oral daily  atorvastatin 10 milliGRAM(s) Oral at bedtime  carvedilol 25 milliGRAM(s) Oral every 12 hours  cefTRIAXone Injectable. 1000 milliGRAM(s) IV Push every 24 hours  doxycycline monohydrate Capsule 100 milliGRAM(s) Oral every 12 hours  enoxaparin Injectable 30 milliGRAM(s) SubCutaneous every 24 hours  ferrous    sulfate 325 milliGRAM(s) Oral daily  hydrALAZINE 25 milliGRAM(s) Oral four times a day  isosorbide   mononitrate ER Tablet (IMDUR) 30 milliGRAM(s) Oral daily  multivitamin/minerals 1 Tablet(s) Oral daily  nicotine - 21 mG/24Hr(s) Patch 1 Patch Transdermal daily  pantoprazole    Tablet 40 milliGRAM(s) Oral before breakfast  thiamine 100 milliGRAM(s) Oral daily    MEDICATIONS  (PRN):  acetaminophen     Tablet .. 650 milliGRAM(s) Oral every 6 hours PRN Temp greater or equal to 38C (100.4F), Mild Pain (1 - 3)  ALPRAZolam 0.5 milliGRAM(s) Oral two times a day PRN Anxiety  aluminum hydroxide/magnesium hydroxide/simethicone Suspension 30 milliLiter(s) Oral every 4 hours PRN Dyspepsia  diphenhydrAMINE 25 milliGRAM(s) Oral every 6 hours PRN Rash and/or Itching  gabapentin 300 milliGRAM(s) Oral four times a day PRN pain  melatonin 3 milliGRAM(s) Oral at bedtime PRN Insomnia  ondansetron   Disintegrating Tablet 4 milliGRAM(s) Oral every 8 hours PRN Nausea and/or Vomiting  ondansetron Injectable 4 milliGRAM(s) IV Push every 8 hours PRN Nausea and/or Vomiting  oxycodone    5 mG/acetaminophen 325 mG 1 Tablet(s) Oral every 4 hours PRN Moderate Pain (4 - 6)      VITALS:  T(F): 98.1 (05-24-23 @ 15:17), Max: 98.1 (05-24-23 @ 15:17)  HR: 59 (05-24-23 @ 15:17) (59 - 73)  BP: 175/75 (05-24-23 @ 15:17) (150/60 - 190/80)  RR: 18 (05-24-23 @ 15:17) (18 - 18)  SpO2: 91% (05-24-23 @ 15:17) (91% - 96%)  Wt(kg): --    I&O's Summary      CAPILLARY BLOOD GLUCOSE          PHYSICAL EXAM:  Gen: NAD  HEENT:  pupils equal and reactive, EOMI, no oropharyngeal lesions, erythema, exudates, oral thrush  NECK:   supple, no carotid bruits, no palpable lymph nodes, no thyromegaly  CV:  +S1, +S2, regular, no murmurs or rubs  RESP:   lungs clear to auscultation bilaterally, no wheezing, rales, rhonchi, good air entry bilaterally  BREAST:  not examined  GI:  abdomen soft, non-tender, non-distended, normal BS, no bruits, no abdominal masses, no palpable masses  RECTAL:  not examined  :  not examined  MSK:   normal muscle tone, no atrophy, no rigidity, no contractions  EXT:  no clubbing, no cyanosis, no edema, no calf pain, swelling or erythema  VASCULAR:  pulses equal and symmetric in the upper and lower extremities  NEURO:  AAOX3, no focal neurological deficits, follows all commands, able to move extremities spontaneously  SKIN:  no ulcers, lesions or rashes    LABS:                            8.0    7.19  )-----------( 374      ( 24 May 2023 07:17 )             26.5     05-24    142  |  113<H>  |  34<H>  ----------------------------<  97  5.1   |  26  |  1.96<H>    Ca    8.1<L>      24 May 2023 07:17    CULTURES:  Imaging:  CXR 5/22: Heart size normal. Left shoulder replacement again noted. Present film shows infiltrate at right base laterally new since May 18.    CT head 5/18: Parenchymal volume loss and chronic microvascular ischemic changes are identified Old lacunar infarct involving the right basal ganglia region is identified. Evaluation of the osseous appropriate window appears unremarkable The visualized paranasal sinuses appear clear Opacification of the right mastoid region is seen which compatible with underlying inflammatory change.    CXR 5/18: The visualized lungs are clear of airspace consolidations or pleural effusions. No pneumothorax. The heart and mediastinum size and configuration are within normal limits. Visualized osseous thorax intact.    Cardiac Testing:  Tele 5/19-20: NSR, rate 80s-90s    TTE 5/19: Mild concentric left ventricular hypertrophy is present. Estimated left ventricular ejection fraction is 55-60 %. The left atrium is mildly dilated. Mild aortic sclerosis is present with normal valvular opening. Moderate (2+) mitral regurgitation is present. EA reversal of the mitral inflow consistent with reduced compliance of the left ventricle.    EKG 5/18: Rate 97. SR. LVH.

## 2023-05-25 ENCOUNTER — TRANSCRIPTION ENCOUNTER (OUTPATIENT)
Age: 68
End: 2023-05-25

## 2023-05-25 VITALS
DIASTOLIC BLOOD PRESSURE: 71 MMHG | RESPIRATION RATE: 18 BRPM | HEART RATE: 63 BPM | OXYGEN SATURATION: 92 % | SYSTOLIC BLOOD PRESSURE: 134 MMHG | TEMPERATURE: 98 F

## 2023-05-25 LAB
ANION GAP SERPL CALC-SCNC: 5 MMOL/L — SIGNIFICANT CHANGE UP (ref 5–17)
BUN SERPL-MCNC: 36 MG/DL — HIGH (ref 7–23)
CALCIUM SERPL-MCNC: 8.1 MG/DL — LOW (ref 8.5–10.1)
CHLORIDE SERPL-SCNC: 110 MMOL/L — HIGH (ref 96–108)
CO2 SERPL-SCNC: 25 MMOL/L — SIGNIFICANT CHANGE UP (ref 22–31)
CREAT SERPL-MCNC: 1.58 MG/DL — HIGH (ref 0.5–1.3)
EGFR: 35 ML/MIN/1.73M2 — LOW
GLUCOSE SERPL-MCNC: 94 MG/DL — SIGNIFICANT CHANGE UP (ref 70–99)
HCT VFR BLD CALC: 26.1 % — LOW (ref 34.5–45)
HGB BLD-MCNC: 8.1 G/DL — LOW (ref 11.5–15.5)
MCHC RBC-ENTMCNC: 28 PG — SIGNIFICANT CHANGE UP (ref 27–34)
MCHC RBC-ENTMCNC: 31 GM/DL — LOW (ref 32–36)
MCV RBC AUTO: 90.3 FL — SIGNIFICANT CHANGE UP (ref 80–100)
PLATELET # BLD AUTO: 343 K/UL — SIGNIFICANT CHANGE UP (ref 150–400)
POTASSIUM SERPL-MCNC: 5 MMOL/L — SIGNIFICANT CHANGE UP (ref 3.5–5.3)
POTASSIUM SERPL-SCNC: 5 MMOL/L — SIGNIFICANT CHANGE UP (ref 3.5–5.3)
RBC # BLD: 2.89 M/UL — LOW (ref 3.8–5.2)
RBC # FLD: 16.1 % — HIGH (ref 10.3–14.5)
SODIUM SERPL-SCNC: 140 MMOL/L — SIGNIFICANT CHANGE UP (ref 135–145)
WBC # BLD: 5.87 K/UL — SIGNIFICANT CHANGE UP (ref 3.8–10.5)
WBC # FLD AUTO: 5.87 K/UL — SIGNIFICANT CHANGE UP (ref 3.8–10.5)

## 2023-05-25 PROCEDURE — 99239 HOSP IP/OBS DSCHRG MGMT >30: CPT

## 2023-05-25 RX ORDER — MULTIVIT-MIN/FERROUS GLUCONATE 9 MG/15 ML
1 LIQUID (ML) ORAL
Qty: 30 | Refills: 0
Start: 2023-05-25 | End: 2023-06-23

## 2023-05-25 RX ORDER — ISOSORBIDE MONONITRATE 60 MG/1
1 TABLET, EXTENDED RELEASE ORAL
Qty: 30 | Refills: 0
Start: 2023-05-25 | End: 2023-06-23

## 2023-05-25 RX ORDER — ATORVASTATIN CALCIUM 80 MG/1
1 TABLET, FILM COATED ORAL
Qty: 30 | Refills: 0
Start: 2023-05-25 | End: 2023-06-23

## 2023-05-25 RX ORDER — PANTOPRAZOLE SODIUM 20 MG/1
1 TABLET, DELAYED RELEASE ORAL
Refills: 0 | DISCHARGE

## 2023-05-25 RX ORDER — ALPRAZOLAM 0.25 MG
1 TABLET ORAL
Qty: 8 | Refills: 0
Start: 2023-05-25 | End: 2023-05-28

## 2023-05-25 RX ORDER — HYDRALAZINE HCL 50 MG
1 TABLET ORAL
Qty: 120 | Refills: 0
Start: 2023-05-25 | End: 2023-06-23

## 2023-05-25 RX ORDER — GABAPENTIN 400 MG/1
1 CAPSULE ORAL
Qty: 120 | Refills: 0
Start: 2023-05-25 | End: 2023-06-23

## 2023-05-25 RX ORDER — PANTOPRAZOLE SODIUM 20 MG/1
1 TABLET, DELAYED RELEASE ORAL
Qty: 30 | Refills: 0
Start: 2023-05-25 | End: 2023-06-23

## 2023-05-25 RX ORDER — CHOLECALCIFEROL (VITAMIN D3) 125 MCG
1000 CAPSULE ORAL
Qty: 30 | Refills: 0
Start: 2023-05-25 | End: 2023-06-23

## 2023-05-25 RX ORDER — FERROUS SULFATE 325(65) MG
1 TABLET ORAL
Qty: 30 | Refills: 0
Start: 2023-05-25 | End: 2023-06-23

## 2023-05-25 RX ORDER — CARVEDILOL PHOSPHATE 80 MG/1
1 CAPSULE, EXTENDED RELEASE ORAL
Qty: 60 | Refills: 0
Start: 2023-05-25 | End: 2023-06-23

## 2023-05-25 RX ORDER — ALPRAZOLAM 0.25 MG
1 TABLET ORAL
Refills: 0 | DISCHARGE

## 2023-05-25 RX ORDER — CEFUROXIME AXETIL 250 MG
1 TABLET ORAL
Qty: 4 | Refills: 0
Start: 2023-05-25 | End: 2023-05-26

## 2023-05-25 RX ORDER — THIAMINE MONONITRATE (VIT B1) 100 MG
1 TABLET ORAL
Qty: 30 | Refills: 0
Start: 2023-05-25 | End: 2023-06-23

## 2023-05-25 RX ORDER — ASPIRIN/CALCIUM CARB/MAGNESIUM 324 MG
1 TABLET ORAL
Qty: 30 | Refills: 0
Start: 2023-05-25 | End: 2023-06-23

## 2023-05-25 RX ORDER — OXYCODONE AND ACETAMINOPHEN 5; 325 MG/1; MG/1
1 TABLET ORAL
Qty: 24 | Refills: 0
Start: 2023-05-25 | End: 2023-05-28

## 2023-05-25 RX ORDER — HYDRALAZINE HCL 50 MG
1 TABLET ORAL
Refills: 0 | DISCHARGE

## 2023-05-25 RX ORDER — GABAPENTIN 400 MG/1
1 CAPSULE ORAL
Refills: 0 | DISCHARGE

## 2023-05-25 RX ORDER — ASCORBIC ACID 60 MG
1 TABLET,CHEWABLE ORAL
Qty: 30 | Refills: 0
Start: 2023-05-25 | End: 2023-06-23

## 2023-05-25 RX ORDER — ASPIRIN/CALCIUM CARB/MAGNESIUM 324 MG
1 TABLET ORAL
Refills: 0 | DISCHARGE

## 2023-05-25 RX ORDER — ATORVASTATIN CALCIUM 80 MG/1
1 TABLET, FILM COATED ORAL
Refills: 0 | DISCHARGE

## 2023-05-25 RX ORDER — CARVEDILOL PHOSPHATE 80 MG/1
1 CAPSULE, EXTENDED RELEASE ORAL
Refills: 0 | DISCHARGE

## 2023-05-25 RX ADMIN — Medication 1 PATCH: at 11:51

## 2023-05-25 RX ADMIN — Medication 25 MILLIGRAM(S): at 02:32

## 2023-05-25 RX ADMIN — Medication 100 MILLIGRAM(S): at 09:32

## 2023-05-25 RX ADMIN — Medication 25 MILLIGRAM(S): at 12:15

## 2023-05-25 RX ADMIN — Medication 25 MILLIGRAM(S): at 17:13

## 2023-05-25 RX ADMIN — PANTOPRAZOLE SODIUM 40 MILLIGRAM(S): 20 TABLET, DELAYED RELEASE ORAL at 06:17

## 2023-05-25 RX ADMIN — Medication 1 PATCH: at 09:32

## 2023-05-25 RX ADMIN — Medication 1000 UNIT(S): at 09:32

## 2023-05-25 RX ADMIN — ISOSORBIDE MONONITRATE 30 MILLIGRAM(S): 60 TABLET, EXTENDED RELEASE ORAL at 09:32

## 2023-05-25 RX ADMIN — Medication 0.5 MILLIGRAM(S): at 12:15

## 2023-05-25 RX ADMIN — Medication 500 MILLIGRAM(S): at 09:32

## 2023-05-25 RX ADMIN — Medication 325 MILLIGRAM(S): at 09:32

## 2023-05-25 RX ADMIN — ENOXAPARIN SODIUM 30 MILLIGRAM(S): 100 INJECTION SUBCUTANEOUS at 09:31

## 2023-05-25 RX ADMIN — Medication 25 MILLIGRAM(S): at 06:17

## 2023-05-25 RX ADMIN — Medication 1 TABLET(S): at 09:32

## 2023-05-25 RX ADMIN — CARVEDILOL PHOSPHATE 25 MILLIGRAM(S): 80 CAPSULE, EXTENDED RELEASE ORAL at 09:32

## 2023-05-25 RX ADMIN — CEFTRIAXONE 1000 MILLIGRAM(S): 500 INJECTION, POWDER, FOR SOLUTION INTRAMUSCULAR; INTRAVENOUS at 09:33

## 2023-05-25 RX ADMIN — Medication 81 MILLIGRAM(S): at 09:32

## 2023-05-25 NOTE — DISCHARGE NOTE PROVIDER - NSDCCPCAREPLAN_GEN_ALL_CORE_FT
PRINCIPAL DISCHARGE DIAGNOSIS  Diagnosis: Hypertensive emergency  Assessment and Plan of Treatment: Take medications as prescribed, follow up with cardiology.      SECONDARY DISCHARGE DIAGNOSES  Diagnosis: Gram-negative pneumonia  Assessment and Plan of Treatment: Take doxycycline and ceftin for two days on discharge to complete course of antibiotics.

## 2023-05-25 NOTE — DISCHARGE NOTE PROVIDER - CARE PROVIDER_API CALL
Gavi Aguayo  Internal Medicine  [address] [place] [id number]  Phone: [phone]  Fax: [phone]  Follow Up Time: 1 week

## 2023-05-25 NOTE — DISCHARGE NOTE PROVIDER - NSDCPNSUBOBJ_GEN_ALL_CORE
VITALS:  T(F): 97.9 (05-25-23 @ 07:13), Max: 98.2 (05-24-23 @ 20:24)  HR: 65 (05-25-23 @ 12:15) (59 - 69)  BP: 152/61 (05-25-23 @ 12:15) (146/58 - 175/79)  RR: 18 (05-25-23 @ 07:13) (18 - 18)  SpO2: 92% (05-25-23 @ 07:13) (90% - 93%)    PHYSICAL EXAM:  Gen: NAD  HEENT:  pupils equal and reactive, EOMI, no oropharyngeal lesions, erythema, exudates, oral thrush  NECK:   supple, no carotid bruits, no palpable lymph nodes, no thyromegaly  CV:  +S1, +S2, regular, no murmurs or rubs  RESP:   lungs clear to auscultation bilaterally, no wheezing, rales, rhonchi, good air entry bilaterally  BREAST:  not examined  GI:  abdomen soft, non-tender, non-distended, normal BS, no bruits, no abdominal masses, no palpable masses  RECTAL:  not examined  :  not examined  MSK:   normal muscle tone, no atrophy, no rigidity, no contractions  EXT:  no clubbing, no cyanosis, no edema, no calf pain, swelling or erythema  VASCULAR:  pulses equal and symmetric in the upper and lower extremities  NEURO:  AAOX3, no focal neurological deficits, follows all commands, able to move extremities spontaneously  SKIN:  no ulcers, lesions or rashes    Imaging:  CXR 5/22: Heart size normal. Left shoulder replacement again noted. Present film shows infiltrate at right base laterally new since May 18.    CT head 5/18: Parenchymal volume loss and chronic microvascular ischemic changes are identified Old lacunar infarct involving the right basal ganglia region is identified. Evaluation of the osseous appropriate window appears unremarkable The visualized paranasal sinuses appear clear Opacification of the right mastoid region is seen which compatible with underlying inflammatory change.    CXR 5/18: The visualized lungs are clear of airspace consolidations or pleural effusions. No pneumothorax. The heart and mediastinum size and configuration are within normal limits. Visualized osseous thorax intact.    Cardiac Testing:  Tele 5/19-20: NSR, rate 80s-90s    TTE 5/19: Mild concentric left ventricular hypertrophy is present. Estimated left ventricular ejection fraction is 55-60 %. The left atrium is mildly dilated. Mild aortic sclerosis is present with normal valvular opening. Moderate (2+) mitral regurgitation is present. EA reversal of the mitral inflow consistent with reduced compliance of the left ventricle.    EKG 5/18: Rate 97. SR. LVH.

## 2023-05-25 NOTE — DISCHARGE NOTE PROVIDER - NSDCFUSCHEDAPPT_GEN_ALL_CORE_FT
Ibis Ornelas  [facility] Physician [facility]  CARDIOLOGY [address]  Scheduled Appointment: 05/31/2023    Gavi Aguayo  [facility] Physician [facility]  INTMED [id number]  Scheduled Appointment: 06/01/2023

## 2023-05-25 NOTE — DISCHARGE NOTE PROVIDER - NSDCMRMEDTOKEN_GEN_ALL_CORE_FT
ALPRAZolam 0.5 mg oral tablet: 1 tab(s) orally 2 times a day MDD: 1mg  ascorbic acid 500 mg oral tablet: 1 tab(s) orally once a day  aspirin 81 mg oral delayed release tablet: 1 tab(s) orally once a day  atorvastatin 10 mg oral tablet: 1 tab(s) orally once a day  carvedilol 25 mg oral tablet: 1 tab(s) orally 2 times a day  cefuroxime 500 mg oral tablet: 1 tab(s) orally 2 times a day  cholecalciferol oral tablet: 1,000 international unit(s) orally once a day 1000 unit(s) orally once a day  doxycycline monohydrate 50 mg oral capsule: 2 cap(s) orally every 12 hours  ferrous sulfate 325 mg (65 mg elemental iron) oral tablet: 1 tab(s) orally once a day  gabapentin 300 mg oral capsule: 1 cap(s) orally 4 times a day as needed for  moderate pain  hydrALAZINE 25 mg oral tablet: 1 tab(s) orally 4 times a day  isosorbide mononitrate 30 mg oral tablet, extended release: 1 tab(s) orally once a day  Multiple Vitamins with Minerals oral tablet: 1 tab(s) orally once a day  pantoprazole 40 mg oral delayed release tablet: 1 tab(s) orally once a day  thiamine 100 mg oral tablet: 1 tab(s) orally once a day

## 2023-05-25 NOTE — DISCHARGE NOTE PROVIDER - HOSPITAL COURSE
CC: elevated BP  HPI and Hospital course: 68 year-old woman with CAD, hx of MI, chronic pain, osteoarthritis, not on medication for past several weeks as she was unable to be seen by her PCP, was residing at a local shelter, sent to ED from new PCP office when she was noted there to have markedly elevated blood pressures. In the ED here, BP elevated. Troponin minimally elevated. BNP 29,000. EKG without acute ischemic changes. CXR clear. CT head w/ old lacunar infarct. Admitted to Medicine.     #Hypertensive emergency  Uncontrolled HTN upon presentation due to lapse in Rx availability. Carvedilol and hydralazine were resumed and while blood pressure improved, it remained suboptimal. Imdur was added thereafter. BP now WNL.   - Continue carvedilol, hydralazine, Imdur    #Elevated troponin  Has known CAD. Remote hx of MI. Hx of coronary stent. Elevated troponin upon admission here likely due to myocardial demand ischemia without infarction, in setting of accelerated HTN. No angina or CHF sx. TTE done. Normal LVEF. No regional wall motion abnormalities.   - Continue aspirin, statin, carvedilol, Imdur    #Chronic diastolic CHF  Likely due to longstanding HTN, suboptimally controlled. Markedly elevated BNP on labs. TTE with mild concentric LVH, preserved LV EF. Mildly dilated LA. Initial CXR clear.  - Continue carvedilol, hydralazine, Imdur  - Is and Os, daily wt    #Moderate mitral regurgitation  As noted on TTE  - Continue to monitor as outpatient     #LIONEL  Cr 1.8 on labs 72hrs ago. No urinary complaints. No hypotensive episodes. No IV contrast. UA negative. Etiology unclear. FENa 1.98%. UPCR 1.5. Normal post void residual volume. Cr now seems to have reached plateau around 1.6.   - Continue to encourage oral hydration  - Continue to trend Cr  - Is and Os  - Avoid nephrotoxic agents    #HyperK  Resolved with Lokelma  - Continue to monitor K    #Unable to rule out pneumonia, unable to rule out Gram-negative organism, not present upon admission  New mild hypoxia as of 5/22 with R lower lung field infiltrate on CXR. No fevers or rigors. No significant chest complaints but did have new malaise. Repeat BNP downtrending. Less likely decompensating CHF.   -on RA including w ambulation  -Doxy, Cefepime changed to CTX, d/c on doxy and ceftin x 2 more days to complete abx course  - Encourage incentive spirometry    #Normocytic anemia, chronicity unclear  Hgb 9.7 on admission. No overt bleeding. No sign of hemolysis. Iron 25  TIBC 377  Iron Sat 7%  Ferritin 48.  Could be mixed anemia of chronic disease and iron deficiency.   - Requested fecal occult blood testing  - Started iron with vit C  - Outpatient follow up for further workup    #Severe protein calorie malnutrition  Appreciate dietician input  - Liberalized diet to regular  - Trend weight  - Added MVI with minerals and thiamine daily    #Physical deconditioning, debility  -PT eval    DVT px: LMWH  Code status: Full  Dispo: For shelter placement.  I have spent 36 min on day of d/c coordinating care.

## 2023-05-25 NOTE — DISCHARGE NOTE PROVIDER - NSDCFUADDAPPT_GEN_ALL_CORE_FT
follow up with dr. Susy BELLE wednesday 5/31/23 at 10:30am     Gavi Aguayo DO   [address] [place] [address], [phone]  Appointment: Thursday June 1, 2023 9:40am

## 2023-05-30 NOTE — HISTORY OF PRESENT ILLNESS
[FreeTextEntry1] : 68 year old undomiciled woman with a history of CAD, MI (~ 5 years ago),\par coronary stent (details not known; placed while living in [place]), chronic pain,\par osteoarthritis, and recent cholecystectomy (March/April 2023) presents today for hospital follow up.  who was referred\par to the ED after establishing care with Dr Aguayo yesterday and found to have a\par severely elevated blood pressure.  She is living in a shelter and has not had\par any medications in several weeks.  However she has been feeling well; no\par angina, dyspnea palpitations, headache.  She offers no complaints at time of\par encounter.  She was previously prescribed Coreg and hydralazine for the\par management of her BP.\par  \par

## 2023-05-30 NOTE — DISCUSSION/SUMMARY
[FreeTextEntry1] : Hypertension.\par -  Plan: Blood pressure improved\par CW current medications\par Creat improving\par \par  \par CAD (coronary artery disease).\par -  Plan: s/p remote MI treated with coronary stent; minimal elevation of\par troponin trended down now normalized and likely related to severe HTN rather\par than ACS.\par No CP/SOB\par Continue ASA/Statin/BB\par MR: Moderate by Echo with NL LV FX would advise periodic surveillance.\par \par #Hypertensive emergency\par Uncontrolled HTN upon presentation due to lapse in Rx availability. Carvedilol\par and hydralazine were resumed and while blood pressure improved, it remained\par suboptimal. Imdur was added thereafter. BP now WNL.\par - Continue carvedilol, hydralazine, Imdur\par  \par #Elevated troponin\par Has known CAD. Remote hx of MI. Hx of coronary stent. Elevated troponin upon\par admission here likely due to myocardial demand ischemia without infarction, in\par setting of accelerated HTN. No angina or CHF sx. TTE done. Normal LVEF. No\par regional wall motion abnormalities.\par - Continue aspirin, statin, carvedilol, Imdur\par  \par #Chronic diastolic CHF\par Likely due to longstanding HTN, suboptimally controlled. Markedly elevated BNP\par on labs. TTE with mild concentric LVH, preserved LV EF. Mildly dilated LA.\par Initial CXR clear.\par - Continue carvedilol, hydralazine, Imdur\par - Is and Os, daily wt\par  \par #Moderate mitral regurgitation\par As noted on TTE\par - Continue to monitor as outpatient\par  \par #LIONEL\par Cr 1.8 on labs 72hrs ago. No urinary complaints. No hypotensive episodes. No IV\par contrast. UA negative. Etiology unclear. FENa 1.98%. UPCR 1.5. Normal post void\par residual volume. Cr now seems to have reached plateau around 1.6.\par - Continue to encourage oral hydration\par - Continue to trend Cr\par - Is and Os\par - Avoid nephrotoxic agents\par  \par #HyperK\par Resolved with Lokelma\par - Continue to monitor K\par  \par #Unable to rule out pneumonia, unable to rule out Gram-negative organism, not\par present upon admission\par New mild hypoxia as of 5/22 with R lower lung field infiltrate on CXR. No\par fevers or rigors. No significant chest complaints but did have new malaise.\par Repeat BNP downtrending. Less likely decompensating CHF.\par -on RA including w ambulation\par -Doxy, Cefepime changed to CTX, d/c on doxy and ceftin x 2 more days to\par complete abx course\par - Encourage incentive spirometry\par  \par #Normocytic anemia, chronicity unclear\par Hgb 9.7 on admission. No overt bleeding. No sign of hemolysis. Iron 25  TIBC\par 377  Iron Sat 7%  Ferritin 48.  Could be mixed anemia of chronic disease and\par iron deficiency.\par - Requested fecal occult blood testing\par - Started iron with vit C\par - Outpatient follow up for further workup\par  \par #Severe protein calorie malnutrition\par Appreciate dietician input\par - Liberalized diet to regular\par - Trend weight\par - Added MVI with minerals and thiamine daily\par  \par #Physical deconditioning, debility\par -PT eval\par  \par

## 2023-05-30 NOTE — CARDIOLOGY SUMMARY
[de-identified] : Echo: Mild concentric left ventricular hypertrophy is present, Estimated left ventricular ejection fraction is 55-60 %, The left atrium is mildly dilated, Mild aortic sclerosis is present with normal valvular opening, Moderate (2+) mitral regurgitation is present.  EA reversal of the mitral inflow consistent with reduced compliance of the left ventricle.\par  \par   [de-identified] : CT Head No Cont (05.18.23 @ 15:09):\par Old lacunar infarct involving the right basal ganglia region is identified.\par Evaluation of the osseous appropriate window appears unremarkable\par The visualized paranasal sinuses appear clear\par Opacification of the right mastoid region is seen which compatible with\par underlying inflammatory change.\par

## 2023-05-31 ENCOUNTER — NON-APPOINTMENT (OUTPATIENT)
Age: 68
End: 2023-05-31

## 2023-05-31 ENCOUNTER — APPOINTMENT (OUTPATIENT)
Dept: CARDIOLOGY | Facility: CLINIC | Age: 68
End: 2023-05-31

## 2023-06-01 ENCOUNTER — APPOINTMENT (OUTPATIENT)
Dept: INTERNAL MEDICINE | Facility: CLINIC | Age: 68
End: 2023-06-01

## 2023-06-01 DIAGNOSIS — I11.0 HYPERTENSIVE HEART DISEASE WITH HEART FAILURE: ICD-10-CM

## 2023-06-01 DIAGNOSIS — I25.2 OLD MYOCARDIAL INFARCTION: ICD-10-CM

## 2023-06-01 DIAGNOSIS — I25.10 ATHEROSCLEROTIC HEART DISEASE OF NATIVE CORONARY ARTERY WITHOUT ANGINA PECTORIS: ICD-10-CM

## 2023-06-01 DIAGNOSIS — N17.9 ACUTE KIDNEY FAILURE, UNSPECIFIED: ICD-10-CM

## 2023-06-01 DIAGNOSIS — I34.0 NONRHEUMATIC MITRAL (VALVE) INSUFFICIENCY: ICD-10-CM

## 2023-06-01 DIAGNOSIS — J15.6 PNEUMONIA DUE TO OTHER GRAM-NEGATIVE BACTERIA: ICD-10-CM

## 2023-06-01 DIAGNOSIS — I24.8 OTHER FORMS OF ACUTE ISCHEMIC HEART DISEASE: ICD-10-CM

## 2023-06-01 DIAGNOSIS — E43 UNSPECIFIED SEVERE PROTEIN-CALORIE MALNUTRITION: ICD-10-CM

## 2023-06-01 DIAGNOSIS — E87.5 HYPERKALEMIA: ICD-10-CM

## 2023-06-01 DIAGNOSIS — F32.A DEPRESSION, UNSPECIFIED: ICD-10-CM

## 2023-06-01 DIAGNOSIS — M19.90 UNSPECIFIED OSTEOARTHRITIS, UNSPECIFIED SITE: ICD-10-CM

## 2023-06-01 DIAGNOSIS — I50.32 CHRONIC DIASTOLIC (CONGESTIVE) HEART FAILURE: ICD-10-CM

## 2023-06-01 DIAGNOSIS — G89.29 OTHER CHRONIC PAIN: ICD-10-CM

## 2023-06-01 DIAGNOSIS — Z96.612 PRESENCE OF LEFT ARTIFICIAL SHOULDER JOINT: ICD-10-CM

## 2023-06-01 DIAGNOSIS — Z79.82 LONG TERM (CURRENT) USE OF ASPIRIN: ICD-10-CM

## 2023-06-01 DIAGNOSIS — D64.9 ANEMIA, UNSPECIFIED: ICD-10-CM

## 2023-06-01 DIAGNOSIS — I16.1 HYPERTENSIVE EMERGENCY: ICD-10-CM

## 2023-06-01 PROBLEM — I10 ESSENTIAL (PRIMARY) HYPERTENSION: Chronic | Status: ACTIVE | Noted: 2023-05-25

## 2023-06-08 ENCOUNTER — APPOINTMENT (OUTPATIENT)
Dept: INTERNAL MEDICINE | Facility: CLINIC | Age: 68
End: 2023-06-08
Payer: MEDICARE

## 2023-06-08 VITALS
SYSTOLIC BLOOD PRESSURE: 180 MMHG | TEMPERATURE: 97.8 F | OXYGEN SATURATION: 97 % | HEART RATE: 66 BPM | DIASTOLIC BLOOD PRESSURE: 79 MMHG | RESPIRATION RATE: 16 BRPM | WEIGHT: 113 LBS | BODY MASS INDEX: 20.02 KG/M2 | HEIGHT: 63 IN

## 2023-06-08 DIAGNOSIS — G89.29 OTHER CHRONIC PAIN: ICD-10-CM

## 2023-06-08 DIAGNOSIS — Z09 ENCOUNTER FOR FOLLOW-UP EXAMINATION AFTER COMPLETED TREATMENT FOR CONDITIONS OTHER THAN MALIGNANT NEOPLASM: ICD-10-CM

## 2023-06-08 DIAGNOSIS — I10 ESSENTIAL (PRIMARY) HYPERTENSION: ICD-10-CM

## 2023-06-08 DIAGNOSIS — F41.9 ANXIETY DISORDER, UNSPECIFIED: ICD-10-CM

## 2023-06-08 PROCEDURE — 99495 TRANSJ CARE MGMT MOD F2F 14D: CPT

## 2023-06-09 RX ORDER — ALPRAZOLAM 0.25 MG/1
0.25 TABLET ORAL
Qty: 15 | Refills: 0 | Status: ACTIVE | COMMUNITY
Start: 2023-06-01 | End: 1900-01-01

## 2023-06-09 RX ORDER — TRAMADOL HYDROCHLORIDE 50 MG/1
50 TABLET, COATED ORAL
Qty: 20 | Refills: 0 | Status: ACTIVE | COMMUNITY
Start: 2023-06-09 | End: 1900-01-01

## 2023-06-12 PROBLEM — G89.29 CHRONIC PAIN: Status: ACTIVE | Noted: 2023-05-19

## 2023-06-12 PROBLEM — I10 UNCONTROLLED HYPERTENSION: Status: ACTIVE | Noted: 2023-06-12

## 2023-06-12 PROBLEM — Z09 HOSPITAL DISCHARGE FOLLOW-UP: Status: ACTIVE | Noted: 2023-06-12

## 2023-06-12 PROBLEM — F41.9 ANXIETY: Status: ACTIVE | Noted: 2023-06-01

## 2023-06-12 NOTE — HISTORY OF PRESENT ILLNESS
[Post-hospitalization from ___ Hospital] : Post-hospitalization from [unfilled] Hospital [Admitted on: ___] : The patient was admitted on [unfilled] [Discharged on ___] : discharged on [unfilled] [FreeTextEntry2] : Ms. URVASHI VILLELA  is    68 year female .  She had seen Dr. Aguayo as a new patient in the last visit when her blood pressure was very high and she was sent to the hospital.\par Past medical history of CAD history of MI, chronic pain, history of surgery in left shoulder joint and lower back, anxiety.\par In the initial work-up her troponin minimally elevated. BNP 29,000. EKG without acute ischemic changes. CXR clear. CT head w/ old lacunar infarct.,  She was admitted to medicine for hypertensive emergency.\par She was started on carb regular, hydralazine and Imdur.  Her blood pressure control stated within the normal range. \par #Elevated troponin\par TTE was  done. Normal LVEF. No regional wall motion abnormalities.\par  \par #Moderate mitral regurgitation:As noted on TTE\par  \par #LIONEL: INTIAL CR WS 1.8 , IMPROVED TO 1.6 AFTER HYDRATION. \par \par #HyperK: Resolved with Lokelma\par \par #Gram negative pneumonia: she was treated with antibiotic and was discharged home with doxy and ceftriaxone.\par pt. stated she is on lot of pain today , she calims compliance with her BP meds.\par She used to be on opiods for chronic pain , she had applied for medicaid , then she can see pain management ( Dr Sherman) , she is asking me to prescribe her pain meds until then.\par  She stated tylenol or NSAIDs is not helping her.\par she also have anxiety , she lives in a shelter , she takes alprazolam asking me to refill.\par smokes cigarette.\par \par  \par

## 2023-06-12 NOTE — REVIEW OF SYSTEMS
[Joint Pain] : joint pain [Muscle Pain] : muscle pain [Back Pain] : back pain [Anxiety] : anxiety [Depression] : depression [Negative] : Respiratory

## 2023-06-12 NOTE — COUNSELING
[Cessation strategies including cessation program discussed] : Cessation strategies including cessation program discussed [Yes] : Risk of tobacco use and health benefits of smoking cessation discussed: Yes [Use of nicotine replacement therapies and other medications discussed] : Use of nicotine replacement therapies and other medications discussed [Encouraged to pick a quit date and identify support needed to quit] : Encouraged to pick a quit date and identify support needed to quit

## 2023-06-12 NOTE — ASSESSMENT
[FreeTextEntry1] : Ms. URVASHI VILLELA  is    68 year female was seen today for a follow-up after she was discharged from the hospital. \par In the initial work-up her troponin minimally elevated. BNP 29,000. EKG without acute ischemic changes. CXR clear. CT head w/ old lacunar infarct.,  She was admitted to medicine for hypertensive emergency.\par She was started on carb regular, hydralazine and Imdur.  Her blood pressure control stated within the normal range. \par She also had #Gram negative pneumonia: she was treated with antibiotic and was discharged home with doxy and ceftriaxone.\par \par Hypertension: Blood pressure is still very high.\par She was discharged home with carvedilol 25 mg twice a day, hydralazine 25 mg 4 times a day, isosorbide mononitrate 30 mg once a day.\par Patient claims she had been compliant with her medication.  She attributes her elevated blood pressure to being on severe pain in her lower back and her left shoulder joint.  She is asking for stronger pain medication.  Patient stated Tylenol and NSAIDs do not help.  She used to be on opioids.\par I discussed with patient that she had to see pain management for her pain.  In the meanwhile I will prescribe her tramadol 50 mg 1 tablet as needed for short duration.\par I stop checked.\par Advised patient to follow-up in 2 weeks for blood pressure check.\par She had hyperkalemia in the hospital which was corrected with Lokelma.\par Patient refused today for blood drawn.  Stated to check in 2 weeks.\par \par Anxiety: Patient complained of severe anxiety, she lives in an [place].  She stated her living situation is very difficult.  She had taken alprazolam in the past.  Asking for prescription.  I discussed in length with patient about addictive potential of alprazolam.  We also discussed about safer treatment for depression and anxiety.  Patient is not willing to try other alternatives.\par Prescribed alprazolam 0.25 mg 1 tablet as needed few pills.  She needs to see a psychiatrist for her anxiety disorder.\par I stop checked.\par \par Hyperlipidemia: On atorvastatin 10 mg.\par Low-fat diet advised.\par \par Nicotine dependence:\par Smoking cessation counseled\par \par \par

## 2023-06-22 ENCOUNTER — APPOINTMENT (OUTPATIENT)
Dept: INTERNAL MEDICINE | Facility: CLINIC | Age: 68
End: 2023-06-22

## 2023-07-20 NOTE — DISCHARGE NOTE PROVIDER - DETAILS OF MALNUTRITION DIAGNOSIS/DIAGNOSES
Phone call to patient. She was seen by dr welch on 7/12/23 for cervical lymphadenopathy and parotid gland enlargement.  She was admitted to [facility] on 6/16/23 and underwent bone marrow and LN biopsies.  Addendum from [facility] was still pending at time of visit.  DR Welch ordered US of head/neck.     Patient had appt with dr beauchamp/hematology today  And he did have results from biopsies available. ( she was unable to confirm results during call. )     . He ordered pet scan and an excisional biopsy of LN of neck per patient.   She asked if dr welch still wanted to proceed with US as ordered.    Agreed to send message to dr welch and on call MD, but advised her to obtain dr beauchamp's opinion regarding US of neck as well.  She agreed.    Routed message to on call MD, Dr Frye and dr welch.     Please advise. Thanks.   This patient has been assessed with a concern for Malnutrition and was treated during this hospitalization for the following Nutrition diagnosis/diagnoses:     -  05/22/2023: Severe protein-calorie malnutrition

## 2024-02-06 NOTE — PROGRESS NOTE ADULT - SUBJECTIVE AND OBJECTIVE BOX
Chief Complaint: Sent to ED for elevated blood pressure    Interval Hx: Patient reports feeling okay, eager to be discharged as BP control has improved. However, patient is planned for discharge to shelter and anticipate that discharge might not occur until . Also, patient is noted today on labs to have increase in Cr, now 1.8, etiology for this is unclear.     ROS: Multi system review is comprehensively negative x 10 systems    Vitals:   T(F): 97.2 (20 May 2023 08:17), Max: 97.3 (19 May 2023 20:35)  HR: 71 (20 May 2023 08:17) (71 - 75)  BP: 157/71 (20 May 2023 08:17) (141/58 - 167/82)  RR: 18 (20 May 2023 08:17) (16 - 18)  SpO2: 94% (20 May 2023 08:17) (94% - 98%) on room air    Exam:  Gen: Comfortable  HEENT: NCAT PERRL EOMI MMM clear oropharynx  Neck: Supple, no JVD, no LAD, no thyromegaly  CVS: s1 s2 normal, RRR  Chest: Normal resp effort, lungs CTA B/L  Abd: +BS, soft NT ND   Ext: No edema or calf tenderness  Skin: Warm, dry  Mood: Calm, pleasant  Neuro: Awake and alert, answers questions appropriately, follows commands, no gross deficits    Labs:                        8.0    7.59  )--------( 446             26.2       Iron 25  TIBC 377  Iron Sat 7%    142  |  112  |  36  ------------------------<  93  5.3   |   24   |  1.83    Ca 7.3  Mg 1.8    TPro  8.2  /  Alb  2.9  /  TBili  0.3  /  DBili  x   /  AST  19  /  ALT  25  /  AlkPhos  111      Troponin 61, 44   proBNP 29,521    Urinalysis Basic - ( 18 May 2023 23:12 )  Color: Yellow / Appearance: Clear / S.010 / pH: x  Gluc: x / Ketone: Negative  / Bili: Negative / Urobili: Negative   Blood: x / Protein: 30 mg/dL / Nitrite: Negative   Leuk Esterase: Negative / RBC: 0-2 /HPF / WBC 0-2 /HPF   Sq Epi: x / Non Sq Epi: x / Bacteria: Few    Imaging:  CT head : Parenchymal volume loss and chronic microvascular ischemic changes are identified Old lacunar infarct involving the right basal ganglia region is identified. Evaluation of the osseous appropriate window appears unremarkable The visualized paranasal sinuses appear clear Opacification of the right mastoid region is seen which compatible with underlying inflammatory change.    CXR : The visualized lungs are clear of airspace consolidations or pleural effusions. No pneumothorax. The heart and mediastinum size and configuration are within normal limits. Visualized osseous thorax intact.    Cardiac Testing:  Tele -: NSR, rate 80s-90s    TTE :  Mild concentric left ventricular hypertrophy is present. Estimated left ventricular ejection fraction is 55-60 %. The left atrium is mildly dilated. Mild aortic sclerosis is present with normal valvular opening. Moderate (2+) mitral regurgitation is present. EA reversal of the mitral inflow consistent with reduced compliance of the left ventricle.    EKG : Rate 97. SR. LVH.     Meds:  MEDICATIONS  (STANDING):  aspirin enteric coated 81 milliGRAM(s) Oral daily  atorvastatin 10 milliGRAM(s) Oral at bedtime  carvedilol 25 milliGRAM(s) Oral every 12 hours  hydrALAZINE 25 milliGRAM(s) Oral four times a day  pantoprazole    Tablet 40 milliGRAM(s) Oral before breakfast    MEDICATIONS  (PRN):  acetaminophen     Tablet .. 650 milliGRAM(s) Oral every 6 hours PRN Temp greater or equal to 38C (100.4F), Mild Pain (1 - 3)  ALPRAZolam 0.5 milliGRAM(s) Oral two times a day PRN Anxiety  aluminum hydroxide/magnesium hydroxide/simethicone Suspension 30 milliLiter(s) Oral every 4 hours PRN Dyspepsia  gabapentin 300 milliGRAM(s) Oral four times a day PRN pain  melatonin 3 milliGRAM(s) Oral at bedtime PRN Insomnia  ondansetron   Disintegrating Tablet 4 milliGRAM(s) Oral every 8 hours PRN Nausea and/or Vomiting  ondansetron Injectable 4 milliGRAM(s) IV Push every 8 hours PRN Nausea and/or Vomiting  oxycodone    5 mG/acetaminophen 325 mG 1 Tablet(s) Oral every 4 hours PRN Moderate Pain (4 - 6)   175.26